# Patient Record
Sex: FEMALE | Race: WHITE | NOT HISPANIC OR LATINO | Employment: OTHER | URBAN - METROPOLITAN AREA
[De-identification: names, ages, dates, MRNs, and addresses within clinical notes are randomized per-mention and may not be internally consistent; named-entity substitution may affect disease eponyms.]

---

## 2017-02-22 ENCOUNTER — OFFICE VISIT (OUTPATIENT)
Dept: FAMILY MEDICINE | Facility: CLINIC | Age: 32
End: 2017-02-22
Payer: MEDICARE

## 2017-02-22 VITALS
TEMPERATURE: 100 F | BODY MASS INDEX: 39.62 KG/M2 | HEART RATE: 130 BPM | WEIGHT: 246.5 LBS | OXYGEN SATURATION: 97 % | SYSTOLIC BLOOD PRESSURE: 144 MMHG | HEIGHT: 66 IN | DIASTOLIC BLOOD PRESSURE: 84 MMHG | RESPIRATION RATE: 18 BRPM

## 2017-02-22 DIAGNOSIS — L03.114 CELLULITIS OF LEFT UPPER EXTREMITY: Primary | ICD-10-CM

## 2017-02-22 PROCEDURE — 99999 PR PBB SHADOW E&M-EST. PATIENT-LVL III: CPT | Mod: PBBFAC,,, | Performed by: INTERNAL MEDICINE

## 2017-02-22 PROCEDURE — 99214 OFFICE O/P EST MOD 30 MIN: CPT | Mod: S$GLB,,, | Performed by: INTERNAL MEDICINE

## 2017-02-22 RX ORDER — BETAMETHASONE VALERATE 0.1 %
LOTION (ML) TOPICAL
COMMUNITY
Start: 2017-01-24 | End: 2018-10-18

## 2017-02-22 RX ORDER — LINACLOTIDE 290 UG/1
CAPSULE, GELATIN COATED ORAL
COMMUNITY
Start: 2017-02-14 | End: 2018-10-18

## 2017-02-22 RX ORDER — MOMETASONE FUROATE 1 MG/G
CREAM TOPICAL
COMMUNITY
Start: 2017-02-10 | End: 2018-10-18

## 2017-02-22 RX ORDER — SULFAMETHOXAZOLE AND TRIMETHOPRIM 800; 160 MG/1; MG/1
1 TABLET ORAL 2 TIMES DAILY
Qty: 14 TABLET | Refills: 0 | Status: SHIPPED | OUTPATIENT
Start: 2017-02-22 | End: 2017-03-01

## 2017-02-22 NOTE — PROGRESS NOTES
"Subjective:       Patient ID: Shadia Johnson is a 31 y.o. female.    Chief Complaint: Cyst (left arm x's 2 days )    HPI Comments: Lump on left arm x one day    HPI: 32 y/o presents for pain red lump on arm x one day. Had fever to 103 yesterday. Last night attempted to "pop" area with needle with return of blood no further discharge. Distant history of previous abscesses in past    Review of Systems   Constitutional: Negative for activity change, appetite change, fatigue, fever and unexpected weight change.   HENT: Negative for ear pain, rhinorrhea and sore throat.    Eyes: Negative for discharge and visual disturbance.   Respiratory: Negative for chest tightness, shortness of breath and wheezing.    Cardiovascular: Negative for chest pain, palpitations and leg swelling.   Gastrointestinal: Negative for abdominal pain, constipation and diarrhea.   Endocrine: Negative for cold intolerance and heat intolerance.   Genitourinary: Negative for dysuria and hematuria.   Musculoskeletal: Negative for joint swelling and neck stiffness.   Skin: Negative for rash.   Neurological: Negative for dizziness, syncope, weakness and headaches.   Psychiatric/Behavioral: Negative for suicidal ideas.       Objective:     Vitals:    02/22/17 1017   BP: (!) 144/84   BP Location: Right arm   Patient Position: Sitting   BP Method: Manual   Pulse: (!) 130   Resp: 18   Temp: 99.8 °F (37.7 °C)   TempSrc: Oral   SpO2: 97%   Weight: 111.8 kg (246 lb 7.6 oz)   Height: 5' 6" (1.676 m)          Physical Exam   Constitutional: She is oriented to person, place, and time. She appears well-developed and well-nourished.   HENT:   Head: Normocephalic and atraumatic.   Eyes: Conjunctivae are normal. Pupils are equal, round, and reactive to light.   Neck: Normal range of motion.   Cardiovascular: Normal rate and regular rhythm.  Exam reveals no gallop and no friction rub.    No murmur heard.  Pulmonary/Chest: Effort normal and breath sounds normal. She " has no wheezes. She has no rales.   Abdominal: Soft. Bowel sounds are normal. There is no tenderness. There is no rebound and no guarding.   Musculoskeletal: Normal range of motion. She exhibits no edema or tenderness.   Neurological: She is alert and oriented to person, place, and time. No cranial nerve deficit.   Skin: Skin is warm and dry. There is erythema.   Left anterior acubital fossa shows 3cm area of non streaking erythema withotu fluctuance, small central area of induration (no expressible discharge) three puncture sites withotu bleeding. Distal pulses intact with good motor function   Psychiatric: She has a normal mood and affect.       Assessment:       1. Cellulitis of left upper extremity        Plan:    no evidence of abscess cover for MRSA with bactrim ds bid x seven days if erythema extends to distal forearm or fever persists recommend ED evaluation

## 2017-02-27 RX ORDER — ALBUTEROL SULFATE 90 UG/1
AEROSOL, METERED RESPIRATORY (INHALATION)
Qty: 36 G | Refills: 5 | Status: SHIPPED | OUTPATIENT
Start: 2017-02-27 | End: 2017-12-21 | Stop reason: SDUPTHER

## 2017-04-27 ENCOUNTER — OFFICE VISIT (OUTPATIENT)
Dept: FAMILY MEDICINE | Facility: CLINIC | Age: 32
End: 2017-04-27
Payer: MEDICARE

## 2017-04-27 VITALS
HEART RATE: 119 BPM | TEMPERATURE: 99 F | OXYGEN SATURATION: 95 % | HEIGHT: 67 IN | BODY MASS INDEX: 39.35 KG/M2 | WEIGHT: 250.69 LBS | DIASTOLIC BLOOD PRESSURE: 80 MMHG | SYSTOLIC BLOOD PRESSURE: 122 MMHG

## 2017-04-27 DIAGNOSIS — B37.2 CUTANEOUS CANDIDIASIS: Primary | ICD-10-CM

## 2017-04-27 PROCEDURE — 1160F RVW MEDS BY RX/DR IN RCRD: CPT | Mod: S$GLB,,, | Performed by: FAMILY MEDICINE

## 2017-04-27 PROCEDURE — 99214 OFFICE O/P EST MOD 30 MIN: CPT | Mod: S$GLB,,, | Performed by: FAMILY MEDICINE

## 2017-04-27 PROCEDURE — 99999 PR PBB SHADOW E&M-EST. PATIENT-LVL III: CPT | Mod: PBBFAC,,, | Performed by: FAMILY MEDICINE

## 2017-04-27 RX ORDER — FLUCONAZOLE 150 MG/1
150 TABLET ORAL ONCE
Qty: 1 TABLET | Refills: 0 | Status: SHIPPED | OUTPATIENT
Start: 2017-04-27 | End: 2017-04-27

## 2017-04-27 RX ORDER — NYSTATIN 100000 [USP'U]/G
POWDER TOPICAL 2 TIMES DAILY
Qty: 30 G | Refills: 0 | Status: SHIPPED | OUTPATIENT
Start: 2017-04-27 | End: 2018-06-04 | Stop reason: SDUPTHER

## 2017-04-27 NOTE — PROGRESS NOTES
Routine Office Visit    Patient Name: Shadia Johnson    : 1985  MRN: [id number]    Subjective:  Shadia is a 32 y.o. female who presents today for:    1. Rash  Patient presenting today with a rash that is located underneath both breasts and in bilateral inguinal areas.  She states that they are red and itchy.  They started after she had a cystoscope earlier this month.  There has been no drainage.  She states that they are uncomfortable due to the itching.  She denies having a rash like this before.  She also complains of an irritated area on her scalp.  She states that it appeared recently too.  Occasionally it does itch, but is more irritated.  There has been no drainage from it either.  She has not been bitten by anything that she knows of.    Past Medical History  Past Medical History:   Diagnosis Date    Anemia     Anxiety     Asthma     Childhood    Depression     Endometriosis of pelvis     Endometriosis, severe     Stage 4    History of endometriosis     Interstitial cystitis     Ulcer     hunners       Past Surgical History  Past Surgical History:   Procedure Laterality Date    COLONOSCOPY      HYSTERECTOMY      endometriosis    interSTIM IMPLANT      LAPOROSCOPY      left salpingectomy      MAMMOGRAM  2010    TRACHELECTOMY  12       Family History  Family History   Problem Relation Age of Onset    Alcohol abuse Mother     Cancer Maternal Grandmother      skin    Depression Maternal Grandmother     Heart disease Paternal Uncle     Hyperlipidemia Paternal Uncle        Social History  Social History     Social History    Marital status:      Spouse name: N/A    Number of children: N/A    Years of education: N/A     Occupational History    Not on file.     Social History Main Topics    Smoking status: Never Smoker    Smokeless tobacco: Never Used      Comment: , .  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  Disabled, SSDI.  Kids: 9 & 2.    Alcohol use No     Drug use: No    Sexual activity: Yes     Partners: Male     Birth control/ protection: Surgical      Comment: : Todd  ( at [facility]).      Other Topics Concern    Not on file     Social History Narrative    .  Occup:  Disabled.         Current Medications  Current Outpatient Prescriptions on File Prior to Visit   Medication Sig Dispense Refill    betamethasone valerate 0.1% (VALISONE) 0.1 % Lotn       diazepam (VALIUM) 10 MG Tab Take 10 mg by mouth once daily.        duloxetine (CYMBALTA) 30 MG capsule Take 30 mg by mouth once daily.      ESTRACE 0.01 % (0.1 mg/gram) vaginal cream APPLY 2 grams VAGINALLY DAILY 42.5 g 4    estradiol 0.5 mg (0.1 %) GlPk Place 0.5 MBq/day onto the skin once daily. 30 packet 11    lidocaine HCL 2% (XYLOCAINE) 2 % jelly       LINZESS 290 mcg Cap       LYRICA 100 mg capsule       mometasone 0.1% (ELOCON) 0.1 % cream       oxycodone (ROXICODONE) 15 MG Tab Take 15 mg by mouth every 4 (four) hours as needed.       OXYCONTIN 30 mg TR12 12 hr tablet       trazodone (DESYREL) 100 MG tablet nightly as needed.       VENTOLIN HFA 90 mcg/actuation inhaler INHALE 2 puffs into the lungs EVERY 6 HOURS AS NEEDED 36 g 5    zolpidem (AMBIEN) 5 MG Tab 10 mg.       calcium citrate-vitamin D3 315-200 mg (CITRACAL+D) 315-200 mg-unit per tablet Take 1 tablet by mouth 2 (two) times daily. 60 tablet 11    [DISCONTINUED] testosterone cypionate (DEPOTESTOTERONE CYPIONATE) 200 mg/mL injection Inject 0.25 mLs (50 mg total) into the muscle every 28 days. 5 mL 4     No current facility-administered medications on file prior to visit.        Allergies   Review of patient's allergies indicates:  No Known Allergies    Review of Systems (Pertinent positives)  Review of Systems   Constitutional: Negative.    HENT: Negative.    Eyes: Negative.    Respiratory: Negative.    Cardiovascular: Negative.    Gastrointestinal: Negative.    Genitourinary: Negative.    Musculoskeletal:  "Negative.    Skin: Positive for itching and rash.         /80 (BP Location: Right arm, Patient Position: Sitting, BP Method: Manual)  Pulse (!) 119  Temp 99.4 °F (37.4 °C) (Oral)   Ht 5' 6.5" (1.689 m)  Wt 113.7 kg (250 lb 10.6 oz)  SpO2 95%  BMI 39.85 kg/m2    GENERAL APPEARANCE: in no apparent distress and well developed and well nourished  HEENT: PERRL, EOMI, Sclera clear, anicteric, Oropharynx clear, no lesions, Neck supple with midline trachea  NECK: normal, supple, no adenopathy, thyroid normal in size  RESPIRATORY: appears well, vitals normal, no respiratory distress, acyanotic, normal RR, chest clear, no wheezing, crepitations, rhonchi, normal symmetric air entry  HEART: regular rate and rhythm, S1, S2 normal, no murmur, click, rub or gallop.    ABDOMEN: abdomen is soft without tenderness, no masses, no hernias, no organomegaly, no rebound, no guarding. Suprapubic tenderness absent. No CVA tenderness.  SKIN: abnormal skin lesion on scalp without drainage; brown in color and no surrounding erythema.  Rash under breasts and in inguinal areas are red patches that resemble fungal infection  PSYCH: Alert, oriented x 3, thought content appropriate, speech normal, pleasant and cooperative, good eye contact, well groomed    Assessment/Plan:  Shadia Johnson is a 32 y.o. female who presents today for :    Shadia was seen today for rash.    Diagnoses and all orders for this visit:    Cutaneous candidiasis  -     nystatin (MYCOSTATIN) powder; Apply topically 2 (two) times daily.  -     fluconazole (DIFLUCAN) 150 MG Tab; Take 1 tablet (150 mg total) by mouth once.      1.  Patient to use Selsum blue for scalp  2.  Diflucan and nystatin powder to rash on torso and inguinal area  3.  Follow up as needed  4  Patient concerned she had a fever today due to having elevated temp after drinking a cold drink this morning.  Physical exam is normal    aVldo Newberry MD    "

## 2017-04-27 NOTE — MR AVS SNAPSHOT
[facility]  [address] [place] [address]  Phone: [phone]                  Shadia Johnson   2017 8:00 AM   Office Visit    Description:  Female : 1985   Provider:  Valdo Newberry MD   Department:  [facility]           Reason for Visit     Rash           Diagnoses this Visit        Comments    Cutaneous candidiasis    -  Primary            To Do List           Goals (5 Years of Data)     None      Follow-Up and Disposition     Return if symptoms worsen or fail to improve.       These Medications        Disp Refills Start End    nystatin (MYCOSTATIN) powder 30 g 0 2017     Apply topically 2 (two) times daily. - Topical (Top)    Pharmacy: [address] Ph #: [phone]       fluconazole (DIFLUCAN) 150 MG Tab 1 tablet 0 2017    Take 1 tablet (150 mg total) by mouth once. - Oral    Pharmacy: [address] Ph #: [phone]         [facility]     [facility] Care Line -  Assistance  Unless otherwise directed by your provider, please contact [facility], our nurse care line that is available for  assistance.     Registered nurses in the [facility] provide: appointment scheduling, clinical advisement, health education, and other advisory services.  Call: [phone] (toll free)               Medications           Message regarding Medications     Verify the changes and/or additions to your medication regime listed below are the same as discussed with your clinician today.  If any of these changes or additions are incorrect, please notify your healthcare provider.        START taking these NEW medications        Refills    nystatin (MYCOSTATIN) powder 0    Sig: Apply topically 2 (two) times daily.    Class: Normal    Route: Topical (Top)    fluconazole (DIFLUCAN) 150 MG Tab 0    Sig:  "Take 1 tablet (150 mg total) by mouth once.    Class: Normal    Route: Oral      STOP taking these medications     testosterone cypionate (DEPOTESTOTERONE CYPIONATE) 200 mg/mL injection Inject 0.25 mLs (50 mg total) into the muscle every 28 days.           Verify that the below list of medications is an accurate representation of the medications you are currently taking.  If none reported, the list may be blank. If incorrect, please contact your healthcare provider. Carry this list with you in case of emergency.           Current Medications     betamethasone valerate 0.1% (VALISONE) 0.1 % Lotn     diazepam (VALIUM) 10 MG Tab Take 10 mg by mouth once daily.      duloxetine (CYMBALTA) 30 MG capsule Take 30 mg by mouth once daily.    ESTRACE 0.01 % (0.1 mg/gram) vaginal cream APPLY 2 grams VAGINALLY DAILY    estradiol 0.5 mg (0.1 %) GlPk Place 0.5 MBq/day onto the skin once daily.    lidocaine HCL 2% (XYLOCAINE) 2 % jelly     LINZESS 290 mcg Cap     LYRICA 100 mg capsule     mometasone 0.1% (ELOCON) 0.1 % cream     oxycodone (ROXICODONE) 15 MG Tab Take 15 mg by mouth every 4 (four) hours as needed.     OXYCONTIN 30 mg TR12 12 hr tablet     trazodone (DESYREL) 100 MG tablet nightly as needed.     VENTOLIN HFA 90 mcg/actuation inhaler INHALE 2 puffs into the lungs EVERY 6 HOURS AS NEEDED    zolpidem (AMBIEN) 5 MG Tab 10 mg.     calcium citrate-vitamin D3 315-200 mg (CITRACAL+D) 315-200 mg-unit per tablet Take 1 tablet by mouth 2 (two) times daily.    fluconazole (DIFLUCAN) 150 MG Tab Take 1 tablet (150 mg total) by mouth once.    nystatin (MYCOSTATIN) powder Apply topically 2 (two) times daily.           Clinical Reference Information           Your Vitals Were     BP Pulse Temp Height    122/80 (BP Location: Right arm, Patient Position: Sitting, BP Method: Manual) 119 99.4 °F (37.4 °C) (Oral) 5' 6.5" (1.689 m)    Weight SpO2 BMI    113.7 kg (250 lb 10.6 oz) 95% 39.85 kg/m2      Blood Pressure          Most Recent Value "    BP  122/80      Allergies as of 4/27/2017     No Known Allergies      Immunizations Administered on Date of Encounter - 4/27/2017     None      Language Assistance Services     ATTENTION: Language assistance services are available, free of charge. Please call [phone].      ATENCIÓN: Si habla jv, tiene a manzano disposición servicios gratuitos de asistencia lingüística. Llame al [phone].     CHÚ Ý: N?u b?n nói Ti?ng Vi?t, có các d?ch v? h? tr? ngôn ng? mi?n phí dành cho b?n. G?i s? [phone].         [facility] complies with applicable Federal civil rights laws and does not discriminate on the basis of race, color, national origin, age, disability, or sex.

## 2017-10-11 ENCOUNTER — APPOINTMENT (OUTPATIENT)
Dept: RADIOLOGY | Facility: HOSPITAL | Age: 32
End: 2017-10-11
Attending: INTERNAL MEDICINE
Payer: MEDICARE

## 2017-10-11 ENCOUNTER — OFFICE VISIT (OUTPATIENT)
Dept: FAMILY MEDICINE | Facility: CLINIC | Age: 32
End: 2017-10-11
Payer: MEDICARE

## 2017-10-11 VITALS
SYSTOLIC BLOOD PRESSURE: 136 MMHG | BODY MASS INDEX: 40.07 KG/M2 | WEIGHT: 255.31 LBS | RESPIRATION RATE: 17 BRPM | TEMPERATURE: 100 F | HEIGHT: 67 IN | OXYGEN SATURATION: 95 % | HEART RATE: 120 BPM | DIASTOLIC BLOOD PRESSURE: 88 MMHG

## 2017-10-11 DIAGNOSIS — J18.9 PNEUMONIA DUE TO INFECTIOUS ORGANISM, UNSPECIFIED LATERALITY, UNSPECIFIED PART OF LUNG: ICD-10-CM

## 2017-10-11 DIAGNOSIS — J18.9 PNEUMONIA DUE TO INFECTIOUS ORGANISM, UNSPECIFIED LATERALITY, UNSPECIFIED PART OF LUNG: Primary | ICD-10-CM

## 2017-10-11 PROCEDURE — 71020 XR CHEST PA AND LATERAL: CPT | Mod: TC,PN

## 2017-10-11 PROCEDURE — 71020 XR CHEST PA AND LATERAL: CPT | Mod: 26,,, | Performed by: RADIOLOGY

## 2017-10-11 PROCEDURE — 99499 UNLISTED E&M SERVICE: CPT | Mod: S$GLB,,, | Performed by: INTERNAL MEDICINE

## 2017-10-11 PROCEDURE — 99999 PR PBB SHADOW E&M-EST. PATIENT-LVL III: CPT | Mod: PBBFAC,,, | Performed by: INTERNAL MEDICINE

## 2017-10-11 PROCEDURE — 99214 OFFICE O/P EST MOD 30 MIN: CPT | Mod: S$GLB,,, | Performed by: INTERNAL MEDICINE

## 2017-10-11 RX ORDER — PREGABALIN 225 MG/1
CAPSULE ORAL
COMMUNITY
Start: 2017-09-28 | End: 2018-10-18

## 2017-10-11 RX ORDER — BENZONATATE 200 MG/1
200 CAPSULE ORAL 3 TIMES DAILY PRN
Qty: 30 CAPSULE | Refills: 0 | Status: SHIPPED | OUTPATIENT
Start: 2017-10-11 | End: 2017-10-21

## 2017-10-11 RX ORDER — ZOLPIDEM TARTRATE 10 MG/1
TABLET ORAL
COMMUNITY
Start: 2017-10-07 | End: 2018-10-18

## 2017-10-11 RX ORDER — LEVOFLOXACIN 500 MG/1
500 TABLET, FILM COATED ORAL DAILY
Qty: 10 TABLET | Refills: 0 | Status: SHIPPED | OUTPATIENT
Start: 2017-10-11 | End: 2018-01-23

## 2017-10-11 RX ORDER — ESTRADIOL VALERATE 20 MG/ML
INJECTION INTRAMUSCULAR
COMMUNITY
Start: 2017-09-08 | End: 2018-02-08 | Stop reason: SDUPTHER

## 2017-10-11 NOTE — PROGRESS NOTES
Subjective:       Patient ID: Shadia Johnson is a 32 y.o. female.    Chief Complaint: Cough; Nasal Congestion; and Sore Throat    Cough   This is a new problem. The current episode started 1 to 4 weeks ago (x2 weeks). The problem has been waxing and waning (worsening over last week). The problem occurs constantly. The cough is productive of purulent sputum. Associated symptoms include chest pain, a fever, nasal congestion, rhinorrhea and wheezing. Pertinent negatives include no chills, ear congestion, ear pain, headaches, heartburn, hemoptysis, myalgias, postnasal drip, rash, sore throat, shortness of breath, sweats or weight loss. Associated symptoms comments: Max temp 103 two days ago taking APAP and NSAID daily  . Nothing aggravates the symptoms. Risk factors for lung disease include animal exposure. She has tried OTC cough suppressant, a beta-agonist inhaler, body position changes, cool air and prescription cough suppressant for the symptoms. The treatment provided no relief. Her past medical history is significant for asthma. There is no history of bronchiectasis, bronchitis, COPD, emphysema, environmental allergies or pneumonia.     Review of Systems   Constitutional: Positive for fever. Negative for activity change, appetite change, chills, fatigue, unexpected weight change and weight loss.   HENT: Positive for rhinorrhea. Negative for ear pain, postnasal drip and sore throat.    Eyes: Negative for discharge and visual disturbance.   Respiratory: Positive for wheezing. Negative for hemoptysis, chest tightness and shortness of breath.    Cardiovascular: Positive for chest pain. Negative for palpitations and leg swelling.   Gastrointestinal: Negative for abdominal pain, constipation, diarrhea and heartburn.   Endocrine: Negative for cold intolerance and heat intolerance.   Genitourinary: Negative for dysuria and hematuria.   Musculoskeletal: Negative for joint swelling, myalgias and neck stiffness.   Skin:  "Negative for rash.   Allergic/Immunologic: Negative for environmental allergies.   Neurological: Negative for dizziness, syncope, weakness and headaches.   Psychiatric/Behavioral: Negative for suicidal ideas.       Objective:     Vitals:    10/11/17 1116   BP: 136/88   BP Location: Left arm   Patient Position: Sitting   BP Method: Large (Manual)   Pulse: (!) 120   Resp: 17   Temp: 99.6 °F (37.6 °C)   TempSrc: Oral   SpO2: 95%   Weight: 115.8 kg (255 lb 4.7 oz)   Height: 5' 6.5" (1.689 m)          Physical Exam   Constitutional: She is oriented to person, place, and time. She appears well-developed and well-nourished.   HENT:   Head: Normocephalic and atraumatic.   Eyes: Conjunctivae are normal. No scleral icterus.   Neck: Normal range of motion.   Cardiovascular: Regular rhythm.  Exam reveals no gallop and no friction rub.    No murmur heard.  No LE edema, tachy rate 100's and regular   Pulmonary/Chest: Effort normal. She has wheezes. She has no rales.   Right upper lobe +crackle no wheeze no dullness to percussion   Abdominal: Soft. Bowel sounds are normal. There is no tenderness. There is no rebound and no guarding.   Musculoskeletal: Normal range of motion. She exhibits no edema or tenderness.   Neurological: She is alert and oriented to person, place, and time. No cranial nerve deficit.   Skin: Skin is warm and dry.   Psychiatric: She has a normal mood and affect.       Assessment:       1. Pneumonia due to infectious organism, unspecified laterality, unspecified part of lung        Plan:    1. Febrile with risk factors for complication including chronic opioid use morbid obesity. With fever and tachycardia (although has been tachy in past when no acutely ill) cover for CAP with fluroquinolone tessalon for symptomatic relief of cough       "

## 2017-10-11 NOTE — PROGRESS NOTES
Patient, Shadia Johnson (MRN #[id number]), presented with a recorded BMI of 40.59 kg/m^2 consistent with the definition of morbid obesity (ICD-10 E66.01). The patient's morbid obesity was monitored, evaluated, addressed and/or treated. This addendum to the medical record is made on 10/11/2017.

## 2017-11-28 DIAGNOSIS — N95.9 MENOPAUSAL AND PERIMENOPAUSAL DISORDER: ICD-10-CM

## 2017-11-28 DIAGNOSIS — N95.1 SYMPTOMATIC MENOPAUSAL OR FEMALE CLIMACTERIC STATES: ICD-10-CM

## 2017-11-30 RX ORDER — ESTRADIOL 0.1 MG/G
CREAM VAGINAL
Qty: 42.5 G | Refills: 6 | OUTPATIENT
Start: 2017-11-30

## 2017-12-21 RX ORDER — ALBUTEROL SULFATE 90 UG/1
AEROSOL, METERED RESPIRATORY (INHALATION)
Qty: 36 G | Refills: 5 | Status: SHIPPED | OUTPATIENT
Start: 2017-12-21 | End: 2018-09-10 | Stop reason: SDUPTHER

## 2018-01-23 ENCOUNTER — OFFICE VISIT (OUTPATIENT)
Dept: FAMILY MEDICINE | Facility: CLINIC | Age: 33
End: 2018-01-23
Payer: MEDICARE

## 2018-01-23 ENCOUNTER — TELEPHONE (OUTPATIENT)
Dept: FAMILY MEDICINE | Facility: CLINIC | Age: 33
End: 2018-01-23

## 2018-01-23 VITALS
HEIGHT: 67 IN | OXYGEN SATURATION: 98 % | HEART RATE: 105 BPM | DIASTOLIC BLOOD PRESSURE: 86 MMHG | WEIGHT: 255.75 LBS | TEMPERATURE: 99 F | SYSTOLIC BLOOD PRESSURE: 130 MMHG | BODY MASS INDEX: 40.14 KG/M2 | RESPIRATION RATE: 17 BRPM

## 2018-01-23 DIAGNOSIS — R68.89 FLU-LIKE SYMPTOMS: Primary | ICD-10-CM

## 2018-01-23 DIAGNOSIS — J06.9 VIRAL URI WITH COUGH: ICD-10-CM

## 2018-01-23 LAB
CTP QC/QA: YES
FLUAV AG NPH QL: NEGATIVE
FLUBV AG NPH QL: NEGATIVE

## 2018-01-23 PROCEDURE — 87804 INFLUENZA ASSAY W/OPTIC: CPT | Mod: 59,QW,S$GLB, | Performed by: NURSE PRACTITIONER

## 2018-01-23 PROCEDURE — 99214 OFFICE O/P EST MOD 30 MIN: CPT | Mod: S$GLB,,, | Performed by: NURSE PRACTITIONER

## 2018-01-23 PROCEDURE — 99499 UNLISTED E&M SERVICE: CPT | Mod: S$GLB,,, | Performed by: NURSE PRACTITIONER

## 2018-01-23 PROCEDURE — 99999 PR PBB SHADOW E&M-EST. PATIENT-LVL III: CPT | Mod: PBBFAC,,, | Performed by: NURSE PRACTITIONER

## 2018-01-23 RX ORDER — CETIRIZINE HYDROCHLORIDE 10 MG/1
10 TABLET ORAL DAILY
Qty: 30 TABLET | Refills: 1 | Status: SHIPPED | OUTPATIENT
Start: 2018-01-23 | End: 2018-10-18

## 2018-01-23 RX ORDER — FLUTICASONE PROPIONATE 50 MCG
2 SPRAY, SUSPENSION (ML) NASAL DAILY
Qty: 1 BOTTLE | Refills: 1 | Status: SHIPPED | OUTPATIENT
Start: 2018-01-23 | End: 2018-02-22

## 2018-01-23 RX ORDER — BENZONATATE 200 MG/1
200 CAPSULE ORAL 3 TIMES DAILY PRN
Qty: 30 CAPSULE | Refills: 0 | Status: SHIPPED | OUTPATIENT
Start: 2018-01-23 | End: 2018-02-02

## 2018-01-23 NOTE — PROGRESS NOTES
Upper Respiratory Infection  Patient complains of a 1day history of some URI complaints. Associated symptoms include T 100.9, congestion, NP cough.  She has attempted dayquil, ibuprofen OTC.  Sick contacts include none.  She has not had a flu shot this season.    Subjective:       Patient ID: Shadia Johnson is a 32 y.o. female.      Review of Systems   Constitutional: Positive for fever.   HENT: Positive for congestion.    Respiratory: Positive for cough.        Objective:      Physical Exam   Constitutional: She is oriented to person, place, and time. She appears well-developed and well-nourished. She appears ill. No distress.   HENT:   Head: Normocephalic and atraumatic.   Right Ear: A middle ear effusion is present.   Left Ear: A middle ear effusion is present.   Nose: Mucosal edema and rhinorrhea present. Right sinus exhibits no maxillary sinus tenderness and no frontal sinus tenderness. Left sinus exhibits no maxillary sinus tenderness and no frontal sinus tenderness.   Mouth/Throat: Uvula is midline and mucous membranes are normal. Posterior oropharyngeal erythema present. No oropharyngeal exudate or posterior oropharyngeal edema.   Cardiovascular: Normal rate, regular rhythm and normal heart sounds.  Exam reveals no friction rub.    No murmur heard.  Pulmonary/Chest: Effort normal and breath sounds normal. No respiratory distress. She has no decreased breath sounds. She has no wheezes. She has no rhonchi. She has no rales.   Musculoskeletal: Normal range of motion.   Lymphadenopathy:        Head (right side): No submental, no submandibular and no tonsillar adenopathy present.        Head (left side): No submental, no submandibular and no tonsillar adenopathy present.     She has no cervical adenopathy.   Neurological: She is alert and oriented to person, place, and time.   Skin: Skin is warm and dry.   Psychiatric: She has a normal mood and affect. Her behavior is normal.   Vitals reviewed.       Assessment:       1. Flu-like symptoms    2. Viral URI with cough        Plan:       Flu-like symptoms  -     POCT Influenza A/B    Viral URI with cough  -     cetirizine (ZYRTEC) 10 MG tablet; Take 1 tablet (10 mg total) by mouth once daily.  Dispense: 30 tablet; Refill: 1  -     fluticasone (FLONASE) 50 mcg/actuation nasal spray; 2 sprays (100 mcg total) by Each Nare route once daily.  Dispense: 1 Bottle; Refill: 1  -     benzonatate (TESSALON) 200 MG capsule; Take 1 capsule (200 mg total) by mouth 3 (three) times daily as needed for Cough.  Dispense: 30 capsule; Refill: 0    Most likely viral.  Will swab for flu since it's been less than 48 hours and treat with tamiflu if positive    Follow up with primary care provider if not improved  Go to ER for new, worse or concerning symptoms  Drink plenty of fluids  Tylenol or ibuprofen as needed for fever or pain

## 2018-01-23 NOTE — PATIENT INSTRUCTIONS
"Follow up with primary care provider if not improved  Go to ER for new, worse or concerning symptoms  Drink plenty of fluids  Tylenol or ibuprofen as needed for fever or pain    Viral Syndrome (Adult)  A viral illness may cause a number of symptoms. The symptoms depend on the part of the body that the virus affects. If it settles in your nose, throat, and lungs, it may cause cough, sore throat, congestion, and sometimes headache. If it settles in your stomach and intestinal tract, it may cause vomiting and diarrhea. Sometimes it causes vague symptoms like "aching all over," feeling tired, loss of appetite, or fever.  A viral illness usually lasts 1 to 2 weeks, but sometimes it lasts longer. In some cases, a more serious infection can look like a viral syndrome in the first few days of the illness. You may need another exam and additional tests to know the difference. Watch for the warning signs listed below.  Home care  Follow these guidelines for taking care of yourself at home:  · If symptoms are severe, rest at home for the first 2 to 3 days.  · Stay away from cigarette smoke - both your smoke and the smoke from others.  · You may use over-the-counter acetaminophen or ibuprofen for fever, muscle aching, and headache, unless another medicine was prescribed for this. If you have chronic liver or kidney disease or ever had a stomach ulcer or GI bleeding, talk with your doctor before using these medicines. No one who is younger than 18 and ill with a fever should take aspirin. It may cause severe disease or death.  · Your appetite may be poor, so a light diet is fine. Avoid dehydration by drinking 8 to 12 8-ounce glasses of fluids each day. This may include water; orange juice; lemonade; apple, grape, and cranberry juice; clear fruit drinks; electrolyte replacement and sports drinks; and decaffeinated teas and coffee. If you have been diagnosed with a kidney disease, ask your doctor how much and what types of fluids " you should drink to prevent dehydration. If you have kidney disease, drinking too much fluid can cause it build up in the your body and be dangerous to your health.  · Over-the-counter remedies won't shorten the length of the illness but may be helpful for cough, sore throat; and nasal and sinus congestion. Don't use decongestants if you have high blood pressure.  Follow-up care  Follow up with your healthcare provider if you do not improve over the next week.  Call 911  Get emergency medical care if any of the following occur:  · Convulsion  · Feeling weak, dizzy, or like you are going to faint  · Chest pain, shortness of breath, wheezing, or difficulty breathing  When to seek medical advice  Call your healthcare provider right away if any of these occur:  · Cough with lots of colored sputum (mucus) or blood in your sputum  · Chest pain, shortness of breath, wheezing, or difficulty breathing  · Severe headache; face, neck, or ear pain  · Severe, constant pain in the lower right side of your belly (abdominal)  · Continued vomiting (cant keep liquids down)  · Frequent diarrhea (more than 5 times a day); blood (red or black color) or mucus in diarrhea  · Feeling weak, dizzy, or like you are going to faint  · Extreme thirst  · Fever of 100.4°F (38°C) or higher, or as directed by your healthcare provider  Date Last Reviewed: 9/25/2015  © 2704-7751 [employer]. [address] [place] [address], [place] [address]. All rights reserved. This information is not intended as a substitute for professional medical care. Always follow your healthcare professional's instructions.

## 2018-01-23 NOTE — PROGRESS NOTES
Patient, Shadia Johnson (MRN #[id number]), presented with a recorded BMI of 40.66 kg/m^2 consistent with the definition of morbid obesity (ICD-10 E66.01). The patient's morbid obesity was monitored, evaluated, addressed and/or treated. This addendum to the medical record is made on 01/23/2018.

## 2018-03-06 ENCOUNTER — OFFICE VISIT (OUTPATIENT)
Dept: FAMILY MEDICINE | Facility: CLINIC | Age: 33
End: 2018-03-06
Payer: MEDICARE

## 2018-03-06 VITALS
OXYGEN SATURATION: 97 % | HEART RATE: 119 BPM | DIASTOLIC BLOOD PRESSURE: 92 MMHG | TEMPERATURE: 98 F | WEIGHT: 263 LBS | HEIGHT: 67 IN | SYSTOLIC BLOOD PRESSURE: 138 MMHG | RESPIRATION RATE: 18 BRPM | BODY MASS INDEX: 41.28 KG/M2

## 2018-03-06 DIAGNOSIS — I47.10 SUPRAVENTRICULAR TACHYCARDIA: Primary | ICD-10-CM

## 2018-03-06 PROCEDURE — 93000 ELECTROCARDIOGRAM COMPLETE: CPT | Mod: S$GLB,,, | Performed by: INTERNAL MEDICINE

## 2018-03-06 PROCEDURE — 99499 UNLISTED E&M SERVICE: CPT | Mod: S$GLB,,, | Performed by: FAMILY MEDICINE

## 2018-03-06 PROCEDURE — 99214 OFFICE O/P EST MOD 30 MIN: CPT | Mod: S$GLB,,, | Performed by: FAMILY MEDICINE

## 2018-03-06 PROCEDURE — 99999 PR PBB SHADOW E&M-EST. PATIENT-LVL V: CPT | Mod: PBBFAC,,, | Performed by: FAMILY MEDICINE

## 2018-03-07 ENCOUNTER — OFFICE VISIT (OUTPATIENT)
Dept: CARDIOLOGY | Facility: CLINIC | Age: 33
End: 2018-03-07
Payer: MEDICARE

## 2018-03-07 ENCOUNTER — HOSPITAL ENCOUNTER (OUTPATIENT)
Dept: CARDIOLOGY | Facility: HOSPITAL | Age: 33
Discharge: HOME | End: 2018-03-07
Attending: INTERNAL MEDICINE
Payer: MEDICARE

## 2018-03-07 ENCOUNTER — PATIENT MESSAGE (OUTPATIENT)
Dept: CARDIOLOGY | Facility: CLINIC | Age: 33
End: 2018-03-07

## 2018-03-07 VITALS
HEIGHT: 67 IN | BODY MASS INDEX: 41.07 KG/M2 | OXYGEN SATURATION: 99 % | WEIGHT: 261.69 LBS | HEART RATE: 102 BPM | SYSTOLIC BLOOD PRESSURE: 174 MMHG | DIASTOLIC BLOOD PRESSURE: 106 MMHG | RESPIRATION RATE: 15 BRPM

## 2018-03-07 DIAGNOSIS — E78.1 ABNORMALLY LOW HIGH DENSITY LIPOPROTEIN (HDL) CHOLESTEROL WITH HYPERTRIGLYCERIDEMIA: ICD-10-CM

## 2018-03-07 DIAGNOSIS — R00.2 HEART PALPITATIONS: ICD-10-CM

## 2018-03-07 DIAGNOSIS — E66.01 MORBID OBESITY, UNSPECIFIED OBESITY TYPE: ICD-10-CM

## 2018-03-07 DIAGNOSIS — R07.9 CHEST PAIN, UNSPECIFIED TYPE: ICD-10-CM

## 2018-03-07 DIAGNOSIS — I10 ESSENTIAL HYPERTENSION: ICD-10-CM

## 2018-03-07 DIAGNOSIS — E78.6 ABNORMALLY LOW HIGH DENSITY LIPOPROTEIN (HDL) CHOLESTEROL WITH HYPERTRIGLYCERIDEMIA: ICD-10-CM

## 2018-03-07 DIAGNOSIS — N30.10 INTERSTITIAL CYSTITIS: ICD-10-CM

## 2018-03-07 DIAGNOSIS — I47.10 PSVT (PAROXYSMAL SUPRAVENTRICULAR TACHYCARDIA): ICD-10-CM

## 2018-03-07 DIAGNOSIS — R00.2 HEART PALPITATIONS: Primary | ICD-10-CM

## 2018-03-07 LAB
DIASTOLIC DYSFUNCTION: NO
ESTIMATED PA SYSTOLIC PRESSURE: 25.28
MITRAL VALVE REGURGITATION: NORMAL
RETIRED EF AND QEF - SEE NOTES: 55 (ref 55–65)
TRICUSPID VALVE REGURGITATION: NORMAL

## 2018-03-07 PROCEDURE — 99499 UNLISTED E&M SERVICE: CPT | Mod: S$GLB,,, | Performed by: INTERNAL MEDICINE

## 2018-03-07 PROCEDURE — 93970 EXTREMITY STUDY: CPT | Mod: 26,,, | Performed by: INTERNAL MEDICINE

## 2018-03-07 PROCEDURE — 99215 OFFICE O/P EST HI 40 MIN: CPT | Mod: S$GLB,,, | Performed by: INTERNAL MEDICINE

## 2018-03-07 PROCEDURE — 93306 TTE W/DOPPLER COMPLETE: CPT | Mod: 26,,, | Performed by: INTERNAL MEDICINE

## 2018-03-07 PROCEDURE — 93970 EXTREMITY STUDY: CPT

## 2018-03-07 PROCEDURE — 3080F DIAST BP >= 90 MM HG: CPT | Mod: S$GLB,,, | Performed by: INTERNAL MEDICINE

## 2018-03-07 PROCEDURE — 3077F SYST BP >= 140 MM HG: CPT | Mod: S$GLB,,, | Performed by: INTERNAL MEDICINE

## 2018-03-07 PROCEDURE — 93306 TTE W/DOPPLER COMPLETE: CPT

## 2018-03-07 PROCEDURE — 99999 PR PBB SHADOW E&M-EST. PATIENT-LVL III: CPT | Mod: PBBFAC,,, | Performed by: INTERNAL MEDICINE

## 2018-03-07 RX ORDER — METOPROLOL SUCCINATE 25 MG/1
25 TABLET, EXTENDED RELEASE ORAL DAILY
Qty: 90 TABLET | Refills: 3 | Status: SHIPPED | OUTPATIENT
Start: 2018-03-07 | End: 2018-06-04 | Stop reason: SDUPTHER

## 2018-03-07 NOTE — PROGRESS NOTES
"Subjective:       Patient ID: Shadia Johnson is a 32 y.o. female.    Chief Complaint: Chest Pain (onset 2 days )    HPI   32 year old female presents with complaint on chest pain for 2 days. She states it is heay pressure sensation located throughout her entire chest from left to right side and from base of neck to bottom of rib cage. Furthermore she has been having palpitations for several weeks. She also reports feeling excessively tired when exerted.   No radiation of pain to arms or neck. No diaphoresis.  She initially states that she has no history of cardiac disease. However, after I told her that I see that she was previously referred to cardiology for fast heart rate, she states that cardiologist told her she had no cardiac problems. I proceeded to review cardio note with her and showed her diagnosis of supraventricular tachycardia, and she states she was not told this. Cardiologist's previous recommendations were reviewed with her, and she claims she does not recall any recommendations.    Review of Systems   Constitutional: Negative for unexpected weight change.   HENT: Negative for ear pain and sore throat.    Eyes: Negative for visual disturbance.   Respiratory: Negative for wheezing.    Cardiovascular: Negative for leg swelling.   Gastrointestinal: Negative for blood in stool.   Endocrine: Negative for cold intolerance and heat intolerance.   Genitourinary: Negative for dysuria and frequency.   Skin: Negative for rash.   Hematological: Negative for adenopathy.   Psychiatric/Behavioral: Negative for suicidal ideas.       Objective:     BP (!) 138/92 (BP Location: Left arm, Patient Position: Sitting, BP Method: Large (Manual))   Pulse (!) 119   Temp 98.4 °F (36.9 °C) (Oral)   Resp 18   Ht 5' 6.5" (1.689 m)   Wt 119.3 kg (263 lb 0.1 oz)   SpO2 97%   BMI 41.81 kg/m²     Physical Exam   Constitutional: She is oriented to person, place, and time.   HENT:   Head: Normocephalic and atraumatic.   Right " Ear: Tympanic membrane, external ear and ear canal normal.   Left Ear: Tympanic membrane, external ear and ear canal normal.   Nose: Nose normal.   Mouth/Throat: Oropharynx is clear and moist.   Eyes: Conjunctivae and EOM are normal. Pupils are equal, round, and reactive to light.   Neck: Normal range of motion. Neck supple.   Cardiovascular: Regular rhythm, normal heart sounds and intact distal pulses.  Tachycardia present.    No murmur heard.  Pulses:       Carotid pulses are 2+ on the right side, and 2+ on the left side.       Radial pulses are 2+ on the right side, and 2+ on the left side.   Pulmonary/Chest: Effort normal and breath sounds normal. She has no wheezes. She has no rhonchi. She exhibits no tenderness.   Palpation of chest wall done with ANGELITA Kirby present in room   Abdominal: Soft. Bowel sounds are normal.   Neurological: She is alert and oriented to person, place, and time. No cranial nerve deficit.   Reflex Scores:       Patellar reflexes are 2+ on the right side and 2+ on the left side.  Psychiatric: She has a normal mood and affect.   Vitals reviewed.      Assessment:       1. Supraventricular tachycardia        Plan:     Shadia was seen today for chest pain. She is an established patient, new to me, with acute worsening of a chronic condition.    Diagnoses and all orders for this visit:    Supraventricular tachycardia  -     Ambulatory referral to Cardiology  Discussed with patient importance of cardiology follow up as she is symptomatic, and appointment was made for tomorrow.   Advised patient to discuss management options with specialist.   Advised if pain gets acutely worse or develops shortness of breath, or radiation to arms or neck, to call 911.

## 2018-03-07 NOTE — PROGRESS NOTES
CARDIOVASCULAR CONSULT    REASON FOR CONSULT:   Shadia Johnson is a 32 y.o. female who presents for f/u of palps, CP, testing.    PCP: Dorys  HISTORY OF PRESENT ILLNESS:   Last seen 9/2016    The patient comes in today for urgent evaluation of palpitations associated chest pain.  Symptoms were in constant for the past several days.  She does have some epigastric tenderness to palpation, although this is unlike the chest discomfort which she has previously been experiencing.  She otherwise has had no lightheadedness or syncope.  She denies any PND, orthopnea, or lower extremity edema.  There's been no melena, or claudicant symptoms.  She does report chronic hematuria related to interstitial cystitis.    The patient seems to think that her current symptomatology is related to stress and anxiety.  I suggested that we should exclude organic causes for her symptoms, and if nothing shows up, that anxiety can be considered as a possibility.    CARDIOVASCULAR HISTORY:   PSVT (Holter 9/2016)  Dyslipidemia (elev TG, low HDL 8/2016)    PAST MEDICAL HISTORY:     Past Medical History:   Diagnosis Date    Anemia     Anxiety     Asthma     Childhood    Depression     Endometriosis of pelvis     Endometriosis, severe     Stage 4    History of endometriosis     Interstitial cystitis     Ulcer     aiden       PAST SURGICAL HISTORY:     Past Surgical History:   Procedure Laterality Date    COLONOSCOPY  2011    HYSTERECTOMY  2010    endometriosis    interSTIM IMPLANT  2011    LAPOROSCOPY  2012    left salpingectomy      MAMMOGRAM  2010    TRACHELECTOMY  12       ALLERGIES AND MEDICATION:   Review of patient's allergies indicates:  No Known Allergies  Previous Medications    ALBUTEROL (VENTOLIN HFA) 90 MCG/ACTUATION INHALER    INHALE 2 puffs into the lungs EVERY 6 HOURS AS NEEDED    BETAMETHASONE VALERATE 0.1% (VALISONE) 0.1 % LOTN        CALCIUM CITRATE-VITAMIN D3 315-200 MG (CITRACAL+D) 315-200 MG-UNIT PER TABLET     Take 1 tablet by mouth 2 (two) times daily.    CETIRIZINE (ZYRTEC) 10 MG TABLET    Take 1 tablet (10 mg total) by mouth once daily.    DULOXETINE (CYMBALTA) 30 MG CAPSULE    Take 30 mg by mouth once daily.    ESTRACE 0.01 % (0.1 MG/GRAM) VAGINAL CREAM    apply 2 grams VAGINALLY DAILY    ESTRADIOL VALERATE (DELESTROGEN) 20 MG/ML INJECTION    INJECT 1 ml EVERY 30 days    LIDOCAINE HCL 2% (XYLOCAINE) 2 % JELLY        LINZESS 290 MCG CAP        LYRICA 225 MG CAP        MOMETASONE 0.1% (ELOCON) 0.1 % CREAM        NYSTATIN (MYCOSTATIN) POWDER    Apply topically 2 (two) times daily.    OXYCODONE (ROXICODONE) 15 MG TAB    Take 15 mg by mouth every 4 (four) hours as needed.     OXYCONTIN 30 MG TR12 12 HR TABLET        ZOLPIDEM (AMBIEN) 10 MG TAB           SOCIAL HISTORY:     Social History     Social History    Marital status:      Spouse name: N/A    Number of children: N/A    Years of education: N/A     Occupational History    Not on file.     Social History Main Topics    Smoking status: Never Smoker    Smokeless tobacco: Never Used      Comment: , .  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  Disabled, SSDI.  Kids: 9 & 2.    Alcohol use No    Drug use: No    Sexual activity: Yes     Partners: Male     Birth control/ protection: Surgical      Comment: : Todd  ( at hotel).      Other Topics Concern    Not on file     Social History Narrative    .  Occup:  Disabled.         FAMILY HISTORY:     Family History   Problem Relation Age of Onset    Alcohol abuse Mother     Cancer Maternal Grandmother      skin    Depression Maternal Grandmother     Heart disease Paternal Uncle     Hyperlipidemia Paternal Uncle        REVIEW OF SYSTEMS:   Review of Systems   Constitutional: Negative for chills, diaphoresis and fever.   HENT: Negative for nosebleeds.    Eyes: Negative for blurred vision, double vision and photophobia.   Respiratory: Negative for hemoptysis, shortness of breath and  "wheezing.    Cardiovascular: Positive for chest pain and palpitations. Negative for orthopnea, claudication, leg swelling and PND.   Gastrointestinal: Negative for abdominal pain, blood in stool, heartburn, melena, nausea and vomiting.   Genitourinary: Positive for hematuria (hx interstitial cystitis). Negative for flank pain.   Musculoskeletal: Negative for falls, myalgias and neck pain.   Skin: Negative for rash.   Neurological: Negative for dizziness, seizures, loss of consciousness, weakness and headaches.   Endo/Heme/Allergies: Negative for polydipsia. Does not bruise/bleed easily.   Psychiatric/Behavioral: Negative for depression and memory loss. The patient is not nervous/anxious.        PHYSICAL EXAM:     Vitals:    03/07/18 1135   BP: (!) 174/106   Pulse: 102   Resp: 15    Body mass index is 41.6 kg/m².  Weight: 118.7 kg (261 lb 11 oz)   Height: 5' 6.5" (168.9 cm)     Physical Exam   Constitutional: She is oriented to person, place, and time. She appears well-developed and well-nourished. She is cooperative.  Non-toxic appearance. No distress.   HENT:   Head: Normocephalic and atraumatic.   Eyes: Conjunctivae and EOM are normal. Pupils are equal, round, and reactive to light. No scleral icterus.   Neck: Trachea normal. Neck supple. Normal carotid pulses and no JVD present. Carotid bruit is not present. No neck rigidity. No edema present. No thyromegaly present.   Cardiovascular: Normal rate, regular rhythm, S1 normal and S2 normal.  PMI is not displaced.  Exam reveals no gallop and no friction rub.    No murmur heard.  Pulses:       Carotid pulses are 2+ on the right side, and 2+ on the left side.  Pulmonary/Chest: Effort normal and breath sounds normal. No respiratory distress. She has no wheezes. She has no rales. She exhibits no tenderness.   Abdominal: Soft. Bowel sounds are normal. She exhibits no distension. There is no hepatosplenomegaly. There is tenderness (epigastric).   obese   Musculoskeletal: " She exhibits no edema or tenderness.   Feet:   Right Foot:   Skin Integrity: Negative for ulcer.   Left Foot:   Skin Integrity: Negative for ulcer.   Neurological: She is alert and oriented to person, place, and time. No cranial nerve deficit.   Skin: Skin is warm and dry. No rash noted. No erythema.   Psychiatric: She has a normal mood and affect. Her speech is normal and behavior is normal.   Vitals reviewed.      DATA:   EKG: (personally reviewed tracing)  3/7/18 SR 96    Laboratory:  CBC:    Recent Labs  Lab 11/02/15  1925 12/02/15  0941 08/31/16  1243   WHITE BLOOD CELL COUNT 15.57 H 21.00 H 11.00   HEMOGLOBIN 12.3 13.0 11.5 L   HEMATOCRIT 38.4 39.8 35.7 L   PLATELETS 464 H 468 H 382 H       CHEMISTRIES:    Recent Labs  Lab 11/02/15  1925 12/02/15  0941 08/31/16  1243   GLUCOSE 103 103 93   SODIUM 139 138 138   POTASSIUM 3.9 3.8 3.9   BUN BLD 10 6 8   CREATININE 0.9 1.0 1.0   EGFR IF AFRICAN AMERICAN >60 >60 >60   EGFR IF NON- >60 >60 >60   CALCIUM 9.2 10.2 9.5       CARDIAC BIOMARKERS:        COAGS:    Recent Labs  Lab 05/13/15  1723   INR 1.0       LIPIDS/LFTS:    Recent Labs  Lab 11/02/15  1925 12/02/15  0941 08/31/16  1243   CHOLESTEROL  --   --  175   TRIGLYCERIDES  --   --  226 H   HDL  --   --  29 L   LDL CHOLESTEROL  --   --  100.8   NON-HDL CHOLESTEROL  --   --  146   AST 20 20 20   ALT 25 53 H 18     Lab Results   Component Value Date    TSH 5.553 (H) 08/31/2016     Free T4 (8/31/16) 0.82 (wnl)    Cardiovascular Testing:  Ex Stress Echo 9/2/16  The patient exercised for 6.0 minutes, corresponding to a functional capacity of 7 estimated METS, achieving a peak heart rate of 162 bpm, which is 91% of the age predicted maximum heart rate. -CP/EKG  Post Exercise Imaging:  Immediate post exercise images demonstrate left ventricular function augmenting.   No exercise induced wall motion abnormalities were identified.   CONCLUSIONS     1 - Normal left ventricular systolic function (EF 55-60%).      2 - Concentric remodeling.     3 - The estimated PA systolic pressure is 27 mmHg.     4 - Trivial mitral regurgitation.     5 - Mild tricuspid regurgitation.     6 - Trivial pulmonic regurgitation.   No evidence of stress induced myocardial ischemia.     Holter 9/2/16  PREDOMINANT RHYTHM  1. Sinus tachycardia with heart rates varying between 57 and 181 bpm with an average of 117 bpm.   VENTRICULAR ARRHYTHMIAS  1. There was a single PVC recorded.   2. There were no episodes of ventricular tachycardia.  SUPRA VENTRICULAR ARRHYTHMIAS  1. There were very rare PACs recorded totalling 1 and averaging less than 1 per hour.   2. There were 5 runs of sustained/non-sustained SVT.   SINUS NODE FUNCTION  1. There was no evidence of high grade SA slava block.   AV CONDUCTION  1. There was no evidence of high grade AV block.   DIARY  1. The diary was not returned  MISCELLANEOUS  1. There were rare hookup related artifacts. Overall, the study was of good quality.   2. This was a tape of adequate length (24 hrs).   CONCLUSIONS:  1. Predominant rhythm is NSR  2. Mulitple NCT likely c/w PSVT @   170 bpm    ASSESSMENT:   # Palps, PSVT noted on Holter 9/2016 (although the patient had no sxs on the day of her Holter).  # Atyp CP/CASANOVA, ADRIANNA 9/2016 normal  # HTN, uncontrolled  # BMI 42, up 2 units vs last OV  # abnl TFTs, seen by endocrinology  # mild anemia, following with urology (for interstitial cystitis) and GYN (for vaginal bleeding)  # dyslipidemia (elev TG, low HDL)    PLAN:   DDx of above: arrhythmia, anxiety, less likely CAD or PE  Start toprol XL 25mg qd  Check echo  Check LE venous US  Check d-dimer, CBC, TSH, CMP  Diet/exercise/weight loss, Na+ avoidance  RTC 1 week    Rene Muse MD, FACC

## 2018-05-30 ENCOUNTER — PES CALL (OUTPATIENT)
Dept: ADMINISTRATIVE | Facility: CLINIC | Age: 33
End: 2018-05-30

## 2018-06-04 ENCOUNTER — PATIENT MESSAGE (OUTPATIENT)
Dept: CARDIOLOGY | Facility: CLINIC | Age: 33
End: 2018-06-04

## 2018-06-04 ENCOUNTER — TELEPHONE (OUTPATIENT)
Dept: OBSTETRICS AND GYNECOLOGY | Facility: CLINIC | Age: 33
End: 2018-06-04

## 2018-06-04 DIAGNOSIS — B37.2 CUTANEOUS CANDIDIASIS: ICD-10-CM

## 2018-06-04 RX ORDER — METOPROLOL SUCCINATE 25 MG/1
25 TABLET, EXTENDED RELEASE ORAL DAILY
Qty: 90 TABLET | Refills: 0 | Status: SHIPPED | OUTPATIENT
Start: 2018-06-04 | End: 2018-09-10 | Stop reason: SDUPTHER

## 2018-06-04 RX ORDER — ESTRADIOL VALERATE 20 MG/ML
INJECTION INTRAMUSCULAR
Qty: 5 ML | Refills: 0 | OUTPATIENT
Start: 2018-06-04

## 2018-06-04 RX ORDER — NYSTATIN 100000 [USP'U]/G
POWDER TOPICAL 2 TIMES DAILY
Qty: 30 G | Refills: 0 | Status: SHIPPED | OUTPATIENT
Start: 2018-06-04 | End: 2018-10-18

## 2018-06-04 NOTE — TELEPHONE ENCOUNTER
Returned patient's call concerning wanting to be seen today because she has a lump. Pt already has an appt to see Dr. Plata on 6/6/18. Unable to reach pt. Left message for pt to call back to office. CW

## 2018-06-04 NOTE — TELEPHONE ENCOUNTER
----- Message from Alyse Keys sent at 6/4/2018  8:10 AM CDT -----  Contact: Self   Pt feels she needs to be seen today, she says she has a lump that needs to be checked out. Please call at [phone]

## 2018-06-06 ENCOUNTER — OFFICE VISIT (OUTPATIENT)
Dept: OBSTETRICS AND GYNECOLOGY | Facility: CLINIC | Age: 33
End: 2018-06-06
Payer: MEDICARE

## 2018-06-06 VITALS
SYSTOLIC BLOOD PRESSURE: 140 MMHG | WEIGHT: 274.56 LBS | HEIGHT: 67 IN | BODY MASS INDEX: 43.09 KG/M2 | DIASTOLIC BLOOD PRESSURE: 83 MMHG

## 2018-06-06 DIAGNOSIS — Z00.00 ANNUAL PHYSICAL EXAM: Primary | ICD-10-CM

## 2018-06-06 DIAGNOSIS — N80.9 ENDOMETRIOSIS: ICD-10-CM

## 2018-06-06 DIAGNOSIS — Z90.710 STATUS POST HYSTERECTOMY: ICD-10-CM

## 2018-06-06 DIAGNOSIS — N95.1 MENOPAUSAL STATE: ICD-10-CM

## 2018-06-06 DIAGNOSIS — Z79.890 HORMONE REPLACEMENT THERAPY (HRT): ICD-10-CM

## 2018-06-06 PROCEDURE — 3077F SYST BP >= 140 MM HG: CPT | Mod: CPTII,S$GLB,, | Performed by: OBSTETRICS & GYNECOLOGY

## 2018-06-06 PROCEDURE — 99999 PR PBB SHADOW E&M-EST. PATIENT-LVL III: CPT | Mod: PBBFAC,,, | Performed by: OBSTETRICS & GYNECOLOGY

## 2018-06-06 PROCEDURE — 3079F DIAST BP 80-89 MM HG: CPT | Mod: CPTII,S$GLB,, | Performed by: OBSTETRICS & GYNECOLOGY

## 2018-06-06 PROCEDURE — 99395 PREV VISIT EST AGE 18-39: CPT | Mod: S$GLB,,, | Performed by: OBSTETRICS & GYNECOLOGY

## 2018-06-06 PROCEDURE — 3008F BODY MASS INDEX DOCD: CPT | Mod: CPTII,S$GLB,, | Performed by: OBSTETRICS & GYNECOLOGY

## 2018-06-06 NOTE — PATIENT INSTRUCTIONS

## 2018-06-06 NOTE — PROGRESS NOTES
Subjective:      Chief Complaint:  VAG   LUMP    Menstrual History:    OB History      Para Term  AB Living    2 2       2    SAB TAB Ectopic Multiple Live Births                       Menarche age: 12     No LMP recorded. Patient has had a hysterectomy.            Objective:        History of Present Illness AND  Examination detailed DICTATE:         HISTORY OF PRESENT ILLNESS:  The patient is 33 years of age,  2, para 2,   here for her annual yearly exam.  Pap smear in 2014 negative.  The patient's   past medical history, asthma, endometriosis, PSVT, IC and depression.  Surgical   problem, hysterectomy, BSO, multiple abdominal surgeries, laparoscopy,   colonoscopy, trachelectomy.  The patient is complaining of pelvic pain, same   location as previously.  Previous ultrasound and CA-125 was negative.  No   indication of elevation or possible recurrence of endometriosis.  She described   the pain as worse with movement, sort of a pulling sensation.  The patient is   taking estrogen vaginal cream though wishes to change to something because it is   too messy.  The patient also was complaining of some vaginal lump.    REVIEW OF SYSTEMS:  HEAD, EAR, EYES, NOSE, AND THROAT:  Negative.  CARDIORESPIRATORY:  PSVT.  GASTROINTESTINAL:  Negative.  GENITOURINARY:  See present illness.    PHYSICAL EXAMINATION:  GENERAL:  Well-developed, well-nourished, alert, oriented female, not in acute   distress.  VITAL SIGNS:  Blood pressure 130\86    WT   255  HEAD:  Normocephalic.  EYES:  Reactive.  NECK:  Supple.  Thyroid is not palpable, no nodes.  CHEST:  Clear.  HEART:  Regular sinus rhythm, no murmur.  BREASTS:  No lumps, mass, discharge, skin changes, retraction, nipple changes.    Axilla negative.  ABDOMEN:  Upper abdomen normal.  Lower abdomen normal.  No guarding, rebound or   tenderness.  PELVIC:  External normal.  Vulva normal.  Bartholin, urethral and Santo glands   are negative.  Vagina is clear.  Has  prominent urethra, nontender, no pain.    Good estrogenization.  Cervix, uterus, adnexa are absent.  Good apical support.    No pain elicited on exam.  RECTAL:  Negative.  MUSCULOSKELETAL:  Normal range of motion.  SKIN:  Clear.  Blood vessels palpable and equal.    IMPRESSION:  Surgical menopause.  Pelvic pain.  We will change the patient's   estrogen replacement to Estring, insert it, leave it in for three months, change   every three months.  Continue with calcium, vitamin D, multivitamin and we will   see her back within a year.      SRUTHI  dd: 06/06/2018 09:38:56 (CDT)  td: 06/07/2018 02:45:06 (CDT)  Doc ID   #[id number]  Job ID #[id number]    CC:         Assessment:      Diagnosis:ANNUAL   EXAM   GYN   EXAM    PELVIC   PAIN    MENOPAUSAL        Plan:      Return in 12  months

## 2018-07-11 DIAGNOSIS — Z79.890 PREMATURE SURGICAL MENOPAUSE ON HRT: Primary | ICD-10-CM

## 2018-07-11 DIAGNOSIS — E89.40 PREMATURE SURGICAL MENOPAUSE ON HRT: Primary | ICD-10-CM

## 2018-07-11 NOTE — TELEPHONE ENCOUNTER
----- Message from Lily Whitaker sent at 7/11/2018  1:10 PM CDT -----  Contact: Self   Refill : estradiol valerate (DELESTROGEN) 20 mg/mL injection      Pharmacy     [facility] - [place], [place] - [address]      ---------------------------------------------------------------------  7/11/18 @ 6699 ([facility])   CALL ATTEMPT TO PT UNSUCCESSFUL , MESSAGE LEFT FOR PT TO RETURN CALL TO OFFICE CONCERNING HER REFILL REQUEST FOR DELESTROGEN,  MESSAGE FORWARDED TO DR FARFAN

## 2018-07-12 RX ORDER — ESTRADIOL VALERATE 20 MG/ML
INJECTION INTRAMUSCULAR
Qty: 5 ML | Refills: 4 | Status: SHIPPED | OUTPATIENT
Start: 2018-07-12 | End: 2019-01-17 | Stop reason: SDUPTHER

## 2018-07-23 ENCOUNTER — PES CALL (OUTPATIENT)
Dept: ADMINISTRATIVE | Facility: CLINIC | Age: 33
End: 2018-07-23

## 2018-08-07 ENCOUNTER — TELEPHONE (OUTPATIENT)
Dept: FAMILY MEDICINE | Facility: CLINIC | Age: 33
End: 2018-08-07

## 2018-08-07 ENCOUNTER — TELEPHONE (OUTPATIENT)
Dept: OBSTETRICS AND GYNECOLOGY | Facility: CLINIC | Age: 33
End: 2018-08-07

## 2018-08-07 DIAGNOSIS — G89.29 OTHER CHRONIC PAIN: Primary | ICD-10-CM

## 2018-08-07 NOTE — TELEPHONE ENCOUNTER
Patient said that she received a letter in the mail stating the pain management provider that she was going to has closed his doors. Patient said that she has been taking Oxycodone 15 mg and Oxycontin 30 mg for a while now and will be needing refills. Patient is requesting a referral to a new pain management provider.

## 2018-08-07 NOTE — TELEPHONE ENCOUNTER
----- Message from Deepti Hopper sent at 8/7/2018  3:08 PM CDT -----  Contact: self  Pt asking for a call to ask a question regarding medication. Please call back at  [phone].

## 2018-08-07 NOTE — TELEPHONE ENCOUNTER
8/7/18 @ 6430 ([facility])   SPOKE WITH MS MICHELLE, INFORMED HER THAT DR FARFAN STATED SHE NEEDS TO SEE HER PCP , AND THEY CAN REFER HER TO A NEW PAIN MGT , PT STATED HER DISAPPOINTMENT

## 2018-08-07 NOTE — TELEPHONE ENCOUNTER
----- Message from Gerard Aguilera sent at 8/7/2018  1:05 PM CDT -----  Contact: Self/[phone]  Patient would like someone to contact her in regards to OXYCONTIN 30 mg TR12 12 hr tablet.    Thank you  --------------------------------------------------------------  8/7/18 @ 6175 ([facility])  SPOKE WITH MS MARTINEZ , SHE STATED HER PAIN MGT  CLOSED HIS OFFICE AND SHE IS IN NEED OF A REFILL FOR HER OXYCONTIN, SHE IS REQUESTING TO SEE IF DR FARFAN COULD POSSIBLY COVER HER FOR A MONTH SUPPLY UNTIL SHE GETS A NEW PAIN MGT  INFORMED HER THAT MORE THAN LIKE DR FARFAN WILL NOT DO THIS BUT I WILL SEND MESSAGE TO HIM

## 2018-08-17 ENCOUNTER — PES CALL (OUTPATIENT)
Dept: ADMINISTRATIVE | Facility: CLINIC | Age: 33
End: 2018-08-17

## 2018-09-10 RX ORDER — ALBUTEROL SULFATE 90 UG/1
AEROSOL, METERED RESPIRATORY (INHALATION)
Qty: 36 G | Refills: 5 | Status: SHIPPED | OUTPATIENT
Start: 2018-09-10 | End: 2019-01-17 | Stop reason: SDUPTHER

## 2018-09-11 RX ORDER — METOPROLOL SUCCINATE 25 MG/1
25 TABLET, EXTENDED RELEASE ORAL DAILY
Qty: 90 TABLET | Refills: 0 | Status: SHIPPED | OUTPATIENT
Start: 2018-09-11 | End: 2019-11-06 | Stop reason: SDUPTHER

## 2018-09-19 ENCOUNTER — PES CALL (OUTPATIENT)
Dept: ADMINISTRATIVE | Facility: CLINIC | Age: 33
End: 2018-09-19

## 2018-09-26 ENCOUNTER — PES CALL (OUTPATIENT)
Dept: ADMINISTRATIVE | Facility: CLINIC | Age: 33
End: 2018-09-26

## 2018-10-18 ENCOUNTER — OFFICE VISIT (OUTPATIENT)
Dept: FAMILY MEDICINE | Facility: CLINIC | Age: 33
End: 2018-10-18
Payer: MEDICARE

## 2018-10-18 VITALS
HEART RATE: 96 BPM | DIASTOLIC BLOOD PRESSURE: 80 MMHG | WEIGHT: 265.19 LBS | SYSTOLIC BLOOD PRESSURE: 122 MMHG | HEIGHT: 66 IN | OXYGEN SATURATION: 99 % | TEMPERATURE: 98 F | BODY MASS INDEX: 42.62 KG/M2

## 2018-10-18 DIAGNOSIS — R05.3 CHRONIC COUGH: Primary | ICD-10-CM

## 2018-10-18 DIAGNOSIS — E66.01 CLASS 3 SEVERE OBESITY DUE TO EXCESS CALORIES WITH SERIOUS COMORBIDITY AND BODY MASS INDEX (BMI) OF 40.0 TO 44.9 IN ADULT: ICD-10-CM

## 2018-10-18 PROCEDURE — 3074F SYST BP LT 130 MM HG: CPT | Mod: CPTII,,, | Performed by: FAMILY MEDICINE

## 2018-10-18 PROCEDURE — 99214 OFFICE O/P EST MOD 30 MIN: CPT | Mod: S$PBB,,, | Performed by: FAMILY MEDICINE

## 2018-10-18 PROCEDURE — 99999 PR PBB SHADOW E&M-EST. PATIENT-LVL III: CPT | Mod: PBBFAC,,, | Performed by: FAMILY MEDICINE

## 2018-10-18 PROCEDURE — 3079F DIAST BP 80-89 MM HG: CPT | Mod: CPTII,,, | Performed by: FAMILY MEDICINE

## 2018-10-18 PROCEDURE — 3008F BODY MASS INDEX DOCD: CPT | Mod: CPTII,,, | Performed by: FAMILY MEDICINE

## 2018-10-18 PROCEDURE — 99213 OFFICE O/P EST LOW 20 MIN: CPT | Mod: PBBFAC,PN | Performed by: FAMILY MEDICINE

## 2018-10-18 RX ORDER — BENZONATATE 200 MG/1
200 CAPSULE ORAL 3 TIMES DAILY PRN
Qty: 30 CAPSULE | Refills: 0 | Status: SHIPPED | OUTPATIENT
Start: 2018-10-18 | End: 2018-10-28

## 2018-10-18 RX ORDER — ESTRADIOL 0.05 MG/D
FILM, EXTENDED RELEASE TRANSDERMAL
COMMUNITY
Start: 2018-09-14 | End: 2018-10-18

## 2018-10-18 RX ORDER — OXYBUTYNIN CHLORIDE 10 MG/1
TABLET, EXTENDED RELEASE ORAL
COMMUNITY
Start: 2018-10-10 | End: 2022-03-29

## 2018-10-18 RX ORDER — OMEPRAZOLE 20 MG/1
20 CAPSULE, DELAYED RELEASE ORAL DAILY
Qty: 30 CAPSULE | Refills: 0 | Status: SHIPPED | OUTPATIENT
Start: 2018-10-18 | End: 2019-01-17 | Stop reason: SDUPTHER

## 2018-10-18 RX ORDER — LIDOCAINE 50 MG/G
OINTMENT TOPICAL
COMMUNITY
Start: 2018-09-14 | End: 2018-10-18

## 2018-10-18 RX ORDER — ESTRADIOL 2 MG/1
SYSTEM VAGINAL
COMMUNITY
Start: 2018-09-08 | End: 2019-07-25 | Stop reason: SDUPTHER

## 2018-10-19 NOTE — PROGRESS NOTES
Routine Office Visit    Patient Name: Shadia Johnson    : 1985  MRN: [id number]    Subjective:  Shadia is a 33 y.o. female who presents today for:   Chief Complaint   Patient presents with    Cough     coughing up clear discharge    Weight Loss       33-year-old female comes in with complaint of a daily cough since .  She states that on this day, she stopped taking pain medications because she no longer wants to be on them.  She claims that she stopped her narcotics cold turkey.  She states that the cough is associated with thick clear mucus, and a sore throat.  She denies allergy symptoms, denies sneezing, denies runny nose, and denies itchy eyes.  The patient does admit to some heartburn symptoms but claims that they are not often.  She states that she does not eat spicy foods, and does not drink much coffee or soda.  She complains of occasional shortness of breath but they are not related to the times that she has the cough.  She denies fevers and chills.  She does have palpitations, however this is not a new problem and she has been to Cardiology in the past.  The patient also states that she made this appointment for weight loss evaluation.  She states that even though she tries portion control in eating properly she cannot lose weight.  She has never seen a bariatric provider.  She is not interested in surgery at present.    Past Medical History  Past Medical History:   Diagnosis Date    Anemia     Anxiety     Asthma     Childhood    Depression     Endometriosis of pelvis     Endometriosis, severe     Stage 4    History of endometriosis     Interstitial cystitis     Ulcer     hunners       Past Surgical History  Past Surgical History:   Procedure Laterality Date    COLONOSCOPY  2011    EXCISION, MASS, ABDOMEN N/A 2013    [facility]    HYSTERECTOMY      endometriosis    interSTIM IMPLANT  2011    LAPOROSCOPY  2012    left  salpingectomy      LYSIS, ADHESIONS N/A 2013    [facility]    MAMMOGRAM      SALPINGO-OOPHORECTOMY, LAPAROSCOPIC Right 2013    [facility]    TRACHELECTOMY  12        Family History  Family History   Problem Relation Age of Onset    Alcohol abuse Mother     Cancer Maternal Grandmother         skin    Depression Maternal Grandmother     Heart disease Paternal Uncle     Hyperlipidemia Paternal Uncle        Social History  Social History     Socioeconomic History    Marital status:      Spouse name: Not on file    Number of children: Not on file    Years of education: Not on file    Highest education level: Not on file   Social Needs    Financial resource strain: Not on file    Food insecurity - worry: Not on file    Food insecurity - inability: Not on file    Transportation needs - medical: Not on file    Transportation needs - non-medical: Not on file   Occupational History    Not on file   Tobacco Use    Smoking status: Never Smoker    Smokeless tobacco: Never Used    Tobacco comment: , .  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  Disabled, SSDI.  Kids: 9 & 2.   Substance and Sexual Activity    Alcohol use: No    Drug use: No    Sexual activity: Yes     Partners: Male     Birth control/protection: Surgical     Comment: : Todd  ( at hotel).    Other Topics Concern    Not on file   Social History Narrative    .  Occup:  Disabled.         Current Medications  Current Outpatient Medications on File Prior to Visit   Medication Sig Dispense Refill    albuterol (VENTOLIN HFA) 90 mcg/actuation inhaler INHALE 2 puffs into the lungs EVERY 6 HOURS AS NEEDED 36 g 5    estradiol valerate (DELESTROGEN) 20 mg/mL injection INJECT 1 ml EVERY 30 days 5 mL 4    ESTRING 2 mg (7.5 mcg /24 hour) vaginal ring       metoprolol succinate (TOPROL-XL) 25 MG 24 hr tablet Take 1 tablet (25 mg total) by  mouth once daily. 90 tablet 0    oxybutynin (DITROPAN-XL) 10 MG 24 hr tablet       [DISCONTINUED] duloxetine (CYMBALTA) 30 MG capsule Take 30 mg by mouth once daily.      [DISCONTINUED] betamethasone valerate 0.1% (VALISONE) 0.1 % Lotn       [DISCONTINUED] calcium citrate-vitamin D3 315-200 mg (CITRACAL+D) 315-200 mg-unit per tablet Take 1 tablet by mouth 2 (two) times daily. 60 tablet 11    [DISCONTINUED] cetirizine (ZYRTEC) 10 MG tablet Take 1 tablet (10 mg total) by mouth once daily. 30 tablet 1    [DISCONTINUED] ESTRACE 0.01 % (0.1 mg/gram) vaginal cream apply 2 grams VAGINALLY DAILY 42.5 g 6    [DISCONTINUED] estradiol 0.05 mg/24 hr td ptsw (VIVELLE-DOT) 0.05 mg/24 hr       [DISCONTINUED] lidocaine (XYLOCAINE) 5 % Oint ointment       [DISCONTINUED] lidocaine HCL 2% (XYLOCAINE) 2 % jelly       [DISCONTINUED] LINZESS 290 mcg Cap       [DISCONTINUED] LYRICA 225 mg Cap       [DISCONTINUED] mometasone 0.1% (ELOCON) 0.1 % cream       [DISCONTINUED] nystatin (MYCOSTATIN) powder Apply topically 2 (two) times daily. 30 g 0    [DISCONTINUED] oxycodone (ROXICODONE) 15 MG Tab Take 15 mg by mouth every 4 (four) hours as needed.       [DISCONTINUED] OXYCONTIN 30 mg TR12 12 hr tablet       [DISCONTINUED] zolpidem (AMBIEN) 10 mg Tab        No current facility-administered medications on file prior to visit.        Allergies   Review of patient's allergies indicates:  No Known Allergies    Review of Systems   Constitutional: Negative for chills and fever.   HENT: Negative for congestion, rhinorrhea, sinus pressure and sneezing.    Respiratory: Positive for cough and shortness of breath. Negative for wheezing.    Cardiovascular: Positive for palpitations.   Gastrointestinal: Positive for abdominal pain. Negative for blood in stool, constipation, diarrhea, nausea and vomiting.   Genitourinary: Negative for dysuria.         /80 (BP Location: Left arm, Patient Position: Sitting, BP Method: Large (Manual))    "Pulse 96   Temp 98.4 °F (36.9 °C) (Oral)   Ht 5' 6" (1.676 m)   Wt 120.3 kg (265 lb 3.4 oz)   SpO2 99%   BMI 42.81 kg/m²     Physical Exam   Constitutional: She appears well-developed and well-nourished.   HENT:   Head: Normocephalic and atraumatic.   Right Ear: External ear normal.   Left Ear: External ear normal.   Nose: Nose normal.   Mouth/Throat: Posterior oropharyngeal erythema present. No oropharyngeal exudate.   Eyes: Conjunctivae and EOM are normal. Pupils are equal, round, and reactive to light. Right eye exhibits no discharge. Left eye exhibits no discharge.   Neck: Normal range of motion. Neck supple. No tracheal deviation present.   Cardiovascular: Normal rate, regular rhythm, normal heart sounds and intact distal pulses.   No murmur heard.  Pulmonary/Chest: Effort normal and breath sounds normal. She has no wheezes. She has no rales.   Abdominal: Soft. Bowel sounds are normal. She exhibits no mass. There is no tenderness.   Lymphadenopathy:     She has no cervical adenopathy.   Psychiatric: She has a normal mood and affect.   Vitals reviewed.      Assessment/Plan:  Shadia was seen today for cough and weight loss.    Diagnoses and all orders for this visit:    Chronic cough  -     omeprazole (PRILOSEC) 20 MG capsule; Take 1 capsule (20 mg total) by mouth once daily.  -     Ambulatory referral to ENT  -     benzonatate (TESSALON) 200 MG capsule; Take 1 capsule (200 mg total) by mouth 3 (three) times daily as needed for Cough.  Discussed with the patient that since it has been 6 weeks since she stopped her narcotic medication, and she continues to have a cough, she needs further evaluation.    She does have some GERD symptoms and signs and we discussed starting on proton pump inhibitor to which she agrees to.  Also will send to ENT for possible laryngoscopy.  Patient may also need gastroenterology referral.    Class 3 severe obesity due to excess calories with serious comorbidity and body mass index " (BMI) of 40.0 to 44.9 in adult  -     Ambulatory consult to Bariatric Medicine  Will refer patient to Bariatric Medicine for evaluation.          This office note has been dictated.  This dictation has been generated using M-Modal Fluency Direct dictation; some phonetic errors may occur.

## 2018-10-26 ENCOUNTER — OFFICE VISIT (OUTPATIENT)
Dept: OTOLARYNGOLOGY | Facility: CLINIC | Age: 33
End: 2018-10-26
Payer: MEDICARE

## 2018-10-26 VITALS
DIASTOLIC BLOOD PRESSURE: 80 MMHG | WEIGHT: 268 LBS | HEIGHT: 65 IN | SYSTOLIC BLOOD PRESSURE: 130 MMHG | BODY MASS INDEX: 44.65 KG/M2

## 2018-10-26 DIAGNOSIS — J30.1 SEASONAL ALLERGIC RHINITIS DUE TO POLLEN: ICD-10-CM

## 2018-10-26 DIAGNOSIS — B96.89 ACUTE BACTERIAL RHINOSINUSITIS: ICD-10-CM

## 2018-10-26 DIAGNOSIS — R05.9 COUGH: ICD-10-CM

## 2018-10-26 DIAGNOSIS — J01.90 ACUTE BACTERIAL RHINOSINUSITIS: ICD-10-CM

## 2018-10-26 DIAGNOSIS — R49.0 HOARSENESS: Primary | ICD-10-CM

## 2018-10-26 DIAGNOSIS — J45.909 BRONCHITIS, ALLERGIC, UNSPECIFIED ASTHMA SEVERITY, UNCOMPLICATED: ICD-10-CM

## 2018-10-26 PROCEDURE — 87070 CULTURE OTHR SPECIMN AEROBIC: CPT

## 2018-10-26 PROCEDURE — 31575 DIAGNOSTIC LARYNGOSCOPY: CPT | Mod: S$GLB,,, | Performed by: OTOLARYNGOLOGY

## 2018-10-26 PROCEDURE — 96372 THER/PROPH/DIAG INJ SC/IM: CPT | Mod: 59,S$GLB,, | Performed by: OTOLARYNGOLOGY

## 2018-10-26 PROCEDURE — 3008F BODY MASS INDEX DOCD: CPT | Mod: CPTII,S$GLB,, | Performed by: OTOLARYNGOLOGY

## 2018-10-26 PROCEDURE — 99203 OFFICE O/P NEW LOW 30 MIN: CPT | Mod: 25,S$GLB,, | Performed by: OTOLARYNGOLOGY

## 2018-10-26 PROCEDURE — 3079F DIAST BP 80-89 MM HG: CPT | Mod: CPTII,S$GLB,, | Performed by: OTOLARYNGOLOGY

## 2018-10-26 PROCEDURE — 3075F SYST BP GE 130 - 139MM HG: CPT | Mod: CPTII,S$GLB,, | Performed by: OTOLARYNGOLOGY

## 2018-10-26 RX ORDER — MONTELUKAST SODIUM 10 MG/1
10 TABLET ORAL NIGHTLY
Qty: 30 TABLET | Refills: 11 | Status: SHIPPED | OUTPATIENT
Start: 2018-10-26 | End: 2018-11-25

## 2018-10-26 RX ORDER — DOXYCYCLINE 100 MG/1
100 CAPSULE ORAL 2 TIMES DAILY
Qty: 28 CAPSULE | Refills: 0 | Status: SHIPPED | OUTPATIENT
Start: 2018-10-26 | End: 2018-11-09

## 2018-10-26 RX ORDER — BENZONATATE 100 MG/1
100 CAPSULE ORAL EVERY 6 HOURS PRN
Qty: 20 CAPSULE | Refills: 1 | Status: SHIPPED | OUTPATIENT
Start: 2018-10-26 | End: 2018-11-05

## 2018-10-26 RX ORDER — METHYLPREDNISOLONE ACETATE 80 MG/ML
60 INJECTION, SUSPENSION INTRA-ARTICULAR; INTRALESIONAL; INTRAMUSCULAR; SOFT TISSUE ONCE
Status: COMPLETED | OUTPATIENT
Start: 2018-10-26 | End: 2018-10-26

## 2018-10-26 RX ADMIN — METHYLPREDNISOLONE ACETATE 60 MG: 80 INJECTION, SUSPENSION INTRA-ARTICULAR; INTRALESIONAL; INTRAMUSCULAR; SOFT TISSUE at 03:10

## 2018-10-26 NOTE — PATIENT INSTRUCTIONS
Takes Singulair daily for allergy.  Do not take antihistamine medications.    Take the doxycycline twice a day with food for 2 weeks.  Be careful in the sun while on doxycycline.  It can make you sun sensitive.    I refilled your cough medicine.  It has no narcotic.    Use your inhaler and nasal spray as needed.    Return in 3 weeks.

## 2018-10-26 NOTE — PROGRESS NOTES
History of Present Illness:  Shadia Johnson presents to the clinic today for Cough; Sore Throat; and Other (Hoarseness)  .  She is a 33-year-old female who has had problems with hoarseness and chronic cough since 2018.  She was taking narcotic medication and stopped it and then developed a cough.  It is mostly nonproductive but sometimes she coughs until she gags.  She has nasal allergy in uses Nasonex.  She has Tessalon for cough.  She has a Ventolin inhaler.  She has a history of gastroesophageal reflux disease which is now being evaluated as a possible cause as well as her visit here today to evaluate her nose and sinuses and larynx for the etiology of her hoarseness and cough.    Past Medical History:   Diagnosis Date    Anemia     Anxiety     Asthma     Childhood    Depression     Endometriosis of pelvis     Endometriosis, severe     Stage 4    History of endometriosis     Interstitial cystitis     Ulcer     hunyolnada     Past Surgical History:   Procedure Laterality Date    COLONOSCOPY      EXCISION, MASS, ABDOMEN N/A 2013    [facility]    HYSTERECTOMY      endometriosis    interSTIM IMPLANT      LAPOROSCOPY      left salpingectomy      LYSIS, ADHESIONS N/A 2013    [facility]    MAMMOGRAM      SALPINGO-OOPHORECTOMY, LAPAROSCOPIC Right 2013    [facility]    TRACHELECTOMY  12     Family History   Problem Relation Age of Onset    Alcohol abuse Mother     Cancer Maternal Grandmother         skin    Depression Maternal Grandmother     Heart disease Paternal Uncle     Hyperlipidemia Paternal Uncle        Social History     Tobacco Use    Smoking status: Never Smoker    Smokeless tobacco: Never Used    Tobacco comment: , .  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  Disabled, SSDI.  Kids: 9 & 2.   Substance Use Topics    Alcohol use: No    Drug use: No          Review of Systems   Ears: Negative for hearing loss, ear pain and ear infections.    Nose:  Positive for nasal obstruction and postnasal drip. Negative for nasal or sinus surgery.    Mouth/Throat: Positive for hoarse voice.       Physical Exam:    Constitutional:   Vital signs are normal. She appears well-developed and well-nourished. She is active. Hoarse voice.      Head:  Normocephalic. Salivary glands normal.  Facial strength is normal.      Ears:  Hearing normal to normal and whispered voice; external ear normal without scars, lesions, or masses; ear canal, tympanic membrane, and middle ear normal..     Nose:    She has hypertrophic allergic nasal turbinates with a slight nasal septal deviation to her left causing left nasal obstruction from the septum and right nasal obstruction from more hypertrophic turbinate. There is clear postnasal drainage at present.    Mouth/Throat  Lips, teeth, and gums normal and oropharynx normal.     Neck:  Neck normal without thyromegaly masses, asymmetry, normal tracheal structure, crepitus, and tenderness, thyroid normal, trachea normal and no adenopathy.       FIBEROPTIC LARYNGOSCOPY -  After appropriate consent, her nose was sprayed with Afrin with tetracaine and an attempt was made to pass a fiberoptic laryngoscope through the left side of her nose. Her nasal septum made the some possible.  The scope was passed through the right side of her nose where she has turbinate hypertrophy. Her nasopharynx was clear with a small amount of adenoid tissue present.  It was nonobstructive.  Examination of the base of tongue, piriform sinuses, vallecula, lingual and laryngeal surfaces of the epiglottis, aryepiglottic folds, interarytenoid area, arytenoid cartilages, false vocal cords and ventricle were all normal. There was erythema of the posterior portion of both true vocal cords without nodules or polyps. The immediate subglottis appeared clear.  Impression:  Normal examination  with the exception of laryngitis with inflammation of true vocal cords which is most likely from chronic cough.  No lesions are present.     Assessment:   Shadia was seen today for cough, sore throat and other.    Diagnoses and all orders for this visit:    Hoarseness    Seasonal allergic rhinitis due to pollen  -     methylPREDNISolone acetate injection 60 mg  -     montelukast (SINGULAIR) 10 mg tablet; Take 1 tablet (10 mg total) by mouth every evening.    Acute bacterial rhinosinusitis  -     methylPREDNISolone acetate injection 60 mg  -     doxycycline (VIBRAMYCIN) 100 MG Cap; Take 1 capsule (100 mg total) by mouth 2 (two) times daily. Take with meals. for 14 days    Bronchitis, allergic, unspecified asthma severity, uncomplicated  -     methylPREDNISolone acetate injection 60 mg  -     montelukast (SINGULAIR) 10 mg tablet; Take 1 tablet (10 mg total) by mouth every evening.  -     doxycycline (VIBRAMYCIN) 100 MG Cap; Take 1 capsule (100 mg total) by mouth 2 (two) times daily. Take with meals. for 14 days    Cough  -     methylPREDNISolone acetate injection 60 mg  -     montelukast (SINGULAIR) 10 mg tablet; Take 1 tablet (10 mg total) by mouth every evening.  -     benzonatate (TESSALON) 100 MG capsule; Take 1 capsule (100 mg total) by mouth every 6 (six) hours as needed for Cough.          Plan:   she has allergic rhinitis and allergic bronchitis complicated by sinusitis.  I ordered Depo-Medrol IM, doxycycline b.i.d. for 2 weeks, C&S of her nose, Mucinex over-the-counter with hydration, Ventolin and Nasonex as needed (which she already has).  I refilled her Tessalon for cough.  She already has laryngitis from chronic cough which is the finding on her laryngeal examination.  I asked her not to take any antihistamine medications.  I placed her instead on Singulair daily.  I will see her back in 3 weeks and possibly reexamine her larynx at that time depending on her treatment success.  We will contact her with  the results of her culture.    No Follow-up on file.

## 2018-10-26 NOTE — LETTER
October 26, 2018      Gerardo Malcolm Jr., MD  [address] [facility] [address]           [facility]  [address] [facility] [address]  [facility] [address]  Phone: [phone]          Patient: Shadia Johnson   MR Number: [id number]   YOB: 1985   Date of Visit: 10/26/2018       Dear Dr. Gerardo Malcolm Jr.:    Thank you for referring Shadia Johnson to me for evaluation. Attached you will find relevant portions of my assessment and plan of care.    If you have questions, please do not hesitate to call me. I look forward to following Shadia Johnson along with you.    Sincerely,    Malachi Rodriguez MD    Enclosure  CC:  No Recipients    If you would like to receive this communication electronically, please contact [email or web address] or [phone] to request more information on [employer] access.    For providers and/or their staff who would like to refer a patient to [facility], please contact us through our one-stop-shop provider referral line, [facility] [place], at [phone].    If you feel you have received this communication in error or would no longer like to receive these types of communications, please e-mail [email or web address]

## 2018-10-28 LAB — BACTERIA NPH AEROBE CULT: NORMAL

## 2018-10-30 ENCOUNTER — PES CALL (OUTPATIENT)
Dept: ADMINISTRATIVE | Facility: CLINIC | Age: 33
End: 2018-10-30

## 2018-11-03 ENCOUNTER — INITIAL CONSULT (OUTPATIENT)
Dept: BARIATRICS | Facility: CLINIC | Age: 33
End: 2018-11-03
Payer: MEDICARE

## 2018-11-03 VITALS
HEART RATE: 96 BPM | DIASTOLIC BLOOD PRESSURE: 72 MMHG | HEIGHT: 65 IN | WEIGHT: 270.5 LBS | SYSTOLIC BLOOD PRESSURE: 114 MMHG | BODY MASS INDEX: 45.07 KG/M2

## 2018-11-03 DIAGNOSIS — K21.9 GASTROESOPHAGEAL REFLUX DISEASE, ESOPHAGITIS PRESENCE NOT SPECIFIED: ICD-10-CM

## 2018-11-03 DIAGNOSIS — I47.10 PSVT (PAROXYSMAL SUPRAVENTRICULAR TACHYCARDIA): ICD-10-CM

## 2018-11-03 DIAGNOSIS — E66.01 OBESITY, CLASS III, BMI 40-49.9 (MORBID OBESITY): Primary | ICD-10-CM

## 2018-11-03 PROCEDURE — 3078F DIAST BP <80 MM HG: CPT | Mod: CPTII,HCNC,S$GLB, | Performed by: INTERNAL MEDICINE

## 2018-11-03 PROCEDURE — 3074F SYST BP LT 130 MM HG: CPT | Mod: CPTII,HCNC,S$GLB, | Performed by: INTERNAL MEDICINE

## 2018-11-03 PROCEDURE — 99215 OFFICE O/P EST HI 40 MIN: CPT | Mod: HCNC,S$GLB,, | Performed by: INTERNAL MEDICINE

## 2018-11-03 PROCEDURE — 3008F BODY MASS INDEX DOCD: CPT | Mod: CPTII,HCNC,S$GLB, | Performed by: INTERNAL MEDICINE

## 2018-11-03 PROCEDURE — 99999 PR PBB SHADOW E&M-EST. PATIENT-LVL III: CPT | Mod: PBBFAC,HCNC,, | Performed by: INTERNAL MEDICINE

## 2018-11-03 RX ORDER — TOPIRAMATE 50 MG/1
50 TABLET, FILM COATED ORAL 2 TIMES DAILY
Qty: 60 TABLET | Refills: 3 | Status: SHIPPED | OUTPATIENT
Start: 2018-11-03 | End: 2019-02-18 | Stop reason: SDUPTHER

## 2018-11-03 NOTE — PATIENT INSTRUCTIONS
Patient was informed that topiramate is used for migraine prevention and seizures. Weight loss is a common side effect that is well documented. She understands this. She was informed of the potential side effects such as serious and possibly fatal rash in which case the medication should be discontinued immediately. Paresthesias, forgetfulness, fatigue, kidney stones, GI symptoms, and changes in lab values such as electrolytes, blood counts and kidney function.      Start topiramate  in the evening for 1 week, then morning and evening.       No soda, sweet tea, juices or lemonade      1350 calories a day  30% carbs (101 grams)  30 % fat (45 grams)  40 % protein (135 grams)  35 min exercise 3 x weekly  Food diary- [employer] [place] (code 98751), Lose it.       Fruits and Vegetables       Include 1-2 servings of fruit daily.      1 serving of fruit includes ½ cup unsweetened applesauce, ½ medium banana, tennis ball size piece of fruit, 17 grapes, 1 cup melon, 1 cup strawberries, ¼ cup dried fruit     Include 2-3 servings of vegetables daily. 1 serving is 1 cup raw or ½ cup cooked.     Non-starchy vegetables include artichoke, asparagus, baby corn, bamboo shoots, beans: green/Italian/wax, bean sprouts, beets, broccoli, [place] sprouts, cabbage, carrots, cauliflower, celery, cucumber, eggplant, green onions or scallions, greens, jicama, leeks, mushrooms, okra, onions, pea pods, peppers, radishes, spinach, summer squash, tomatoes and salsa, turnips, vegetable juice cocktail, water chestnuts, zucchini        1200- 1500 calorie Sample meal plan   80-120g protein per day.   Protein drinks and bars: 0-4 grams sugar   Drink lots of water throughout the day and exercise!   MENU # 1   Breakfast: 2 eggs, 1 turkey sausage ellis, 1 apple   Snack: P3 protein pack  Lunch: 2 roll-ups (sliced turkey, low-fat sliced cheese, mustard), 12 baby carrots dipped in 1 Tbsp natural peanut butter   Mid-Day Snack: ¼ cup unsalted almonds, ½ cup  fruit   Dinner: 1 chicken thigh simmered in tomato sauce + 2 Tbsp mozzarella cheese, ½ cup black beans, 1/2 cup steamed carrots   Evening Snack: 1/2 cup grapes with 4 cubes low-fat cheddar cheese   ___________________________________________________   MENU # 2   Breakfast: 200 calorie protein drink   Mid-morning snack : ¼ cup unsalted nuts, medium banana   Lunch: 3oz tuna or chicken salad made with 2 tbsp light downing, over salad with tomatoes and cucumbers.   Snack: low-fat cheese stick   Dinner: 3oz hamburger ellis, slice of low-fat cheese, 1 cup yellow squash and zucchini sauteed in nonstick cookspray  Snack: 6oz light yogurt   _______________________________________________________   Menu #3   Breakfast: 6oz plain Greek yogurt + fruit (½ banana OR ½ cup fruit - pineapple, blueberries, strawberries, peach), may add Splenda to jeanna.   Lunch: Grilled chicken breast w/ slice low-fat pepper sanjay cheese, 1/2 cup grilled/baked asparagus and small salad with Salad Spritzer.   Mid-Day snack: 200 calorie protein drink   Dinner: 4oz Grilled fish, ½ cup white beans, ½ cup cooked spinach   Evening Snack: fudgsicle no-sugar-added   Menu # 4   Breakfast: 1 scoop vanilla protein powder + 4oz skim milk + 4oz coffee   Snack: Pure protein bar   Lunch: 2 Lettuce tacos: 3oz seasoned ground turkey wrapped in a [place] lettuce leaves with 1 Tbsp shredded cheese and dollop low-fat sour cream   Snack: ½ cup cottage cheese, ½ cup pineapple chunks   Dinner: Shrimp omelet: 2 eggs, ½ cup shrimp, green onions, and shredded cheese   ______________________________________________________   Menu #5   Breakfast: 1 cup low-fat cottage cheese, ½ cup peaches (no sugar added)   Snack: 1 apple, 4 cubes cheese   Lunch: 3oz baked pork chop, 1 cup okra and tomato stew   Snack: 1/2 cup black beans + salsa + dollop light sour cream   Dinner: Caprese chicken salad: 3 oz chicken breast, 1oz fresh mozzarella, sliced tomato, 1 Tbsp olive oil, basil   Snack:  sugar-free popsicle   _______________________________________________________  Menu #6   Breakfast: ½ cup part-skim ricotta cheese, 2 Tbsp sugar-free strawberry preserves, sprinkle of slivered almonds   Snack: 1 orange + string cheese  Lunch: 3 oz canned chicken, 1oz shredded cheddar cheese, ½ cup black beans, 2 Tbsp salsa   Snack: 200 calorie Protein drink   Dinner: 4 oz grilled salmon steak, over mixed green salad with 2 tbsp light dressing   _______________________________________________________  Menu #7  Breakfast: crust-less quiche (bake 1 egg mixed w/ low fat cheese, broccoli and low sodium ham in a muffin cup until cooked inside)  Snack: 1 light yogurt  Lunch: protein shake (1 scoop powder + 8 oz skim milk, blended w/ ice)  Snack: small apple w/ 2 tbsp PB2 (powdered peanut butter)  Dinner: 2 Turkey meatballs w/ low sugar marinara sauce, 1/2 cup spiralized zucchini (sauteed on stove top w/ nonstick cookspray)        Meal Ideas for Regular Bariatric Diet  *Recipes and products available at [email or web address].com      Breakfast: (15-20g protein)    - Egg white omelet: 2 egg whites or ½ cup Egg Beaters. (Optional proteins: cheese, shrimp, black beans, chicken, sliced turkey) (Optional veggies: tomatoes, salsa, spinach, mushrooms, onions, green peppers, or small slice avocado)     - Egg and sausage: 1 egg or ¼ cup Egg Beaters (any variety), with 1 ellis or 2 links of Turkey sausage or Veggie breakfast sausage (Ladonna Farms or Boca)    - Crust-less breakfast quiche: To make a glass pie dish, mix 4oz part skim Ricotta, 1 cup skim milk, and 2 eggs as your base. Add protein: shredded cheese, sliced lean ham or turkey, turkey aleman/sausage. Add veggies: tomato, onion, green onion, mushroom, green pepper, spinach, etc.    - Yogurt parfait: Mix 1 - 6oz container Dannon Light N Fit vanilla yogurt, with ¼ cup Kashi Go Lean cereal    - Cottage cheese and fruit: ½ cup part-skim cottage cheese or ricotta cheese topped  with fresh fruit or sugar free preserves     - Ana Valdivia's Vanilla Egg custard* (add 2 Tbsp instant coffee granules to make Cappuccino Custard*)    - Hi-Protein café latte (skim milk, decaf coffee, 1 scoop protein powder). Optional to add Sugar free syrup or extract flavoring.    Lunch: (20-30g protein)    - ½ cup Black bean soup (Homemade or Progresso), with ¼ cup shredded low-fat cheese. Top with chopped tomato or fresh salsa.     - Lean deli turkey breast and low-fat sliced cheese, mustard or light downing to moisten, rolled up together, or wrapped in a [place] lettuce leaf    - Chicken salad made from dinner leftovers, moisten with low-fat salad dressing or light downing. Also try leftover salmon, shrimp, tuna or boiled eggs. Serve ½ cup over dark green salad    - Fat-free canned refried beans, topped with ¼ cup shredded low-fat cheese. Top with chopped tomato or fresh salsa.     - Greek salad: Top mixed greens with 1-2oz grilled chicken, tomatoes, red onions, 2-3 kalamata olives, and sprinkle lightly with feta cheese. Spritz with Balsamic vinegar to taste.     - Crust-less lunch quiche: To make a glass pie dish, mix 4oz part skim Ricotta, 1 cup skim milk, and 2 eggs as your base. Add protein: shredded cheese, sliced lean ham or turkey, shrimp, chicken. Add veggies: tomato, onion, green onion, mushroom, green pepper, spinach, artichoke, broccoli, etc.    - Pizza bake: tomato sauce, low-fat shredded mozzarella and turkey pepperoni or Lopez aleman. Add any veggies.    - Cucumber crab bites: Spread ¼ cup crab dip (lump crabmeat + light cream cheese and green onions) over sliced cucumber.     - Chicken with light spinach and artichoke dip*: Puree in : 6oz cooked and drained spinach, 2 cloves garlic, 1 can cannelloni beans, ½ cup chopped green onions, 1 can drained artichoke hearts (not marinated in oil), lemon juice and basil. Mix in 2oz chopped up chicken.    Supper: (20-30g protein)    - Serve grilled  fish over dark green salad tossed with low-fat dressing, served with grilled asparagus robles     - Rotisserie chicken salad: served with sliced strawberries, walnuts, fat-free feta cheese crumbles and 1 tbsp Hares Own Light Raspberry Sunray Vinaigrette    - Shrimp cocktail: Dip cold boiled shrimp in homemade low-sugar cocktail sauce (1/2 cup Roma One Carb ketchup, 2 tbsp horseradish, 1/4 tsp hot sauce, 1 tsp Worcestershire sauce, 1 tbsp freshly-squeezed lemon juice). Serve with dark green salad, walnuts, and crumbled blue cheese drizzled with olive oil and Balsamic vinegar    - Tuna Melt: Spread tuna salad onto 2 thick slices of tomato. Top with low-fat cheese and broil until cheese is melted. May also be made with chicken salad of shrimp salad. Winder with different types of cheeses.    - Homemade low-fat Chili using extra lean ground beef or ground turkey. Top with shredded cheese and salsa as desired. May add dollop fat-free sour cream if desired    - Dinner Omelet with shrimp or chicken and onion, green peppers and chives.    - No noodle lasagna: Use sliced zucchini or eggplant in place of noodles.  Layer with part skim ricotta cheese and low sugar meat sauce (use very lean ground beef or ground turkey).    - Mexican chicken bake: Bake chunks of chicken breast or thigh with taco seasoning, Pace brand enchilada sauce, green onions and low-fat cheese. Serve with ¼ cup black beans or fat free refried beans topped with chopped tomatoes or salsa.    - Vadim frozen meatballs, simmered in Classico Marinara sauce. Different flavors of salsa or spaghetti sauce create different dishes! Sprinkle with parmesan cheese. Serve with grilled or steamed veggies, or a dark green salad.    - Simmer boneless skinless chicken thigh chunks in Classico Marinara sauce or roasted salsa until tender with chopped onion, bell pepper, garlic, mushrooms, spinach, etc.     - Hamburger, without the bun, dressed the way you like.  Served with grilled or steamed veggies.    - Eggplant parmesan: Bake slices of eggplant at 350 degrees for 15 minutes. Layer tomato sauce, sliced eggplant and low-fat mozzarella cheese in a baking dish and cover with foil. Bake 30-40 more minutes or until bubbly. Uncover and bake at 400 degrees for about 15 more minutes, or until top is slightly crisp.    - Fish tacos: grilled/baked white fish, wrapped in [place] lettuce leaf, topped with salsa, shredded low-fat cheese, and light coleslaw.    Snacks: (100-200 calories; >5g protein)    - 1 low-fat cheese stick with 8 cherry tomatoes or 1 serving fresh fruit  - 4 thin slices fat-free turkey breast and 1 slice low-fat cheese  - 4 thin slices fat-free honey ham with wedge of melon  - 1/4 cup unsalted nuts with ½ cup fruit  - 6-oz container Dannon Light n Fit vanilla yogurt, topped with 1oz unsalted nuts         - apple, celery or baby carrots spread with 2 Tbsp natural peanut butter or almond butter   - apple slices with 1 oz slice low-fat cheese  - celery, cucumber, bell pepper or baby carrots dipped in ¼ cup hummus bean spread or light spinach and artichoke dip (*recipe in lunch section)  - 100 calorie bag microwave light popcorn with 3 tbsp grated parmesan cheese  - David Links Beef Steak - 14g protein! (similar to beef jerky)  - 2 wedges [place] - Light Herb & Garlic Cheese with sliced cucumber or green bell pepper  - 1/2 cup low-fat cottage cheese with ¼ cup fruit or ¼ cup salsa  - RTD Protein drinks: Atkins, Low Carb Slim Fast, EAS light, Muscle Milk Light, etc.  - Homemade Protein drinks: GNC Soy95, Isopure, Nectar, UNJURY, Whey Gourmet, etc. Mix 1 scoop powder with 8oz skim/1% milk or light soymilk.  - Protein bars: Atkins, EAS, Pure Protein, Think Thin, Detour, etc. Must have 0-4 grams sugar - Read the label.    Takeout Options: No more than twice/week  Deli - Salads (no pasta or rice), meats, cheeses. Roasted chicken. Lox (salmon)    Mexican - [facility]  which don't include tortillas, chips, or rice. Go easy on the beans. Example: Fajitas without the tortillas. Ask the  not to bring chips to the table if they are too tempting.    Greek - Meat or fish and vegetable, but no bread or rice. Including hummus, baba ganoush, etc, is OK. Most sit-down Greek restaurants can provide you with cucumber slices for dipping instead of yusuf bread.    Fast Food (Avoid as much as possible) - Salads (no croutons and limit salad dressing to 2 tbsp), grilled chicken sandwich without the bun and ask for no downing. Luzs low fat chili or Taco Bell pintos and cheese.    BBQ - The meats are fine if you ask for sauces on the side, but most of the traditional side dishes are loaded with carbs. Giuliano slaw, baked beans and BBQ sauce are typically made with sugar.    Chinese - Nothing deep-fried, no rice or noodles. Many Chinese sauces have starch and sugar in them, so you'll have to use your judgement. If you find that these sauces trigger cravings, or cause Dumping, you can ask for the sauce to be made without sugar or just use soy sauce.

## 2018-11-03 NOTE — PROGRESS NOTES
Subjective:       Patient ID: Shadia Johnson is a 33 y.o. female.    Chief Complaint: Consult    CC: weight    Current attempts at weight loss: New pt to me, referred by Gerardo Malcolm Jr., MD  [address] [place] [address] , with Patient Active Problem List:     Chronic pain     Depression     Interstitial cystitis     Menopausal state     Status post hysterectomy     Endometriosis     IC (interstitial cystitis)     Vaginitis     Pelvic adhesive disease     Pelvic pain in female     PSVT (paroxysmal supraventricular tachycardia)     Abnormally low high density lipoprotein (HDL) cholesterol with hypertriglyceridemia     Hormone replacement therapy (HRT)               No efforts currently.  Exercise with walking few times a week for 1 hour    Previous diet attempts: LCD, Cardiac diet, keto diet, IC diet,     History of medication for loss:   checked today. Phentermine and xenical in past. States did ok.     Heaviest weight: 270#    Lightest weight: 130#    Goal weight: 140#      Last eye exam:    Last year. No glaucoma per pt                                   Provider:    Typical eating patterns:  Disabled. Lives with , kids and parents. She and her mom cook.   Breakfast: May skip. Grits.     Lunch: sandwich. Weekends: same. May skip    Dinner: Spaghetti. Chicken with potatoes and corn. Turkey with cranberry sauce and carrots.     Snacks: peanuts,     Beverages: water, drAnderson Pepper- 3/day. Denies ETOH    Willingness to change:  8/10    EKG:Sinus tachycardia  Low voltage QRS  Borderline Abnormal ECG  When compared with ECG of 18-AUG-2016 09:01,  No significant change was found    CONCLUSIONS     1 - Normal left ventricular systolic function (EF 55-60%).     2 - Mild tricuspid regurgitation.     3 - Trivial mitral regurgitation.    BMR: 1975      Review of Systems   Constitutional: Negative for chills and fever.   Respiratory: Positive for shortness of breath.         + snores   Cardiovascular:  "Positive for palpitations. Negative for chest pain and leg swelling.   Gastrointestinal: Negative for constipation and diarrhea.        + GERD   Genitourinary: Positive for difficulty urinating and dysuria.   Musculoskeletal: Positive for back pain. Negative for arthralgias.   Neurological: Negative for dizziness and light-headedness.   Psychiatric/Behavioral: Positive for dysphoric mood. The patient is nervous/anxious.        Objective:     /72   Pulse 96   Ht 5' 5" (1.651 m)   Wt 122.7 kg (270 lb 8.1 oz)   BMI 45.01 kg/m²     Physical Exam   Constitutional: She is oriented to person, place, and time. She appears well-developed. No distress.   Morbidly obese     HENT:   Head: Normocephalic and atraumatic.   Eyes: EOM are normal. Pupils are equal, round, and reactive to light. No scleral icterus.   Neck: Normal range of motion. Neck supple. No thyromegaly present.   Cardiovascular: Normal rate and normal heart sounds. Exam reveals no gallop and no friction rub.   No murmur heard.  Pulmonary/Chest: Effort normal and breath sounds normal. No respiratory distress. She has no wheezes.   Abdominal: Soft. Bowel sounds are normal. She exhibits no distension. There is tenderness in the right lower quadrant and left lower quadrant.   Musculoskeletal: Normal range of motion. She exhibits no edema.   Neurological: She is alert and oriented to person, place, and time. No cranial nerve deficit.   Skin: Skin is warm and dry. No erythema.   Psychiatric: She has a normal mood and affect. Her behavior is normal. Judgment normal.   Vitals reviewed.      Assessment:       1. Obesity, Class III, BMI 40-49.9 (morbid obesity)    2. Gastroesophageal reflux disease, esophagitis presence not specified    3. PSVT (paroxysmal supraventricular tachycardia)        Plan:         1. Obesity, Class III, BMI 40-49.9 (morbid obesity)  Patient was informed that topiramate is used for migraine prevention and seizures. Weight loss is a common " side effect that is well documented. She understands this. She was informed of the potential side effects such as serious and possibly fatal rash in which case the medication should be discontinued immediately. Paresthesias, forgetfulness, fatigue, kidney stones, GI symptoms, and changes in lab values such as electrolytes, blood counts and kidney function.      Start topiramate  in the evening for 1 week, then morning and evening.       No soda, sweet tea, juices or lemonade      1350 calories a day  30% carbs (101 grams)  30 % fat (45 grams)  40 % protein (135 grams)  35 min exercise 3 x weekly  Food diary- [facility] [place] (code 03920), Lose it.     3535-9060 nader menu and meal ideas given. Nutrition materials provided today.      2. Gastroesophageal reflux disease, esophagitis presence not specified  Expect improvement with weight loss  Attempt to wean PPI once 10% TBW lost.       3. PSVT (paroxysmal supraventricular tachycardia)  Avoid stimulant anorectics

## 2018-11-03 NOTE — LETTER
November 3, 2018      Gerardo Malcolm Jr., MD  [address] [facility]  [place] [address]           [facility]ariat[facility] Surgery  [address]  [place] [address]  Phone: [phone]          Patient: Shadia Johnson   MR Number: [id number]   YOB: 1985   Date of Visit: 11/3/2018       Dear Dr. Gerardo Malcolm Jr.:    Thank you for referring Shadia Johnson to me for evaluation. Attached you will find relevant portions of my assessment and plan of care.    If you have questions, please do not hesitate to call me. I look forward to following Shadia Johnson along with you.    Sincerely,    Rosi Velazquez MD    Enclosure  CC:  No Recipients    If you would like to receive this communication electronically, please contact [email or web address] or [phone] to request more information on [employer] Link access.    For providers and/or their staff who would like to refer a patient to [facility], please contact us through our one-stop-shop provider referral line, [facility] , at [phone].    If you feel you have received this communication in error or would no longer like to receive these types of communications, please e-mail [email or web address]

## 2018-11-19 ENCOUNTER — OFFICE VISIT (OUTPATIENT)
Dept: OTOLARYNGOLOGY | Facility: CLINIC | Age: 33
End: 2018-11-19
Payer: MEDICARE

## 2018-11-19 VITALS
DIASTOLIC BLOOD PRESSURE: 80 MMHG | SYSTOLIC BLOOD PRESSURE: 110 MMHG | BODY MASS INDEX: 44.48 KG/M2 | HEIGHT: 65 IN | WEIGHT: 267 LBS

## 2018-11-19 DIAGNOSIS — J30.1 SEASONAL ALLERGIC RHINITIS DUE TO POLLEN: ICD-10-CM

## 2018-11-19 DIAGNOSIS — R05.9 COUGH: Primary | ICD-10-CM

## 2018-11-19 DIAGNOSIS — J32.9 CHRONIC SINUSITIS, UNSPECIFIED LOCATION: ICD-10-CM

## 2018-11-19 DIAGNOSIS — J40 BRONCHITIS: ICD-10-CM

## 2018-11-19 PROCEDURE — 3074F SYST BP LT 130 MM HG: CPT | Mod: CPTII,S$GLB,, | Performed by: OTOLARYNGOLOGY

## 2018-11-19 PROCEDURE — 3008F BODY MASS INDEX DOCD: CPT | Mod: CPTII,S$GLB,, | Performed by: OTOLARYNGOLOGY

## 2018-11-19 PROCEDURE — 3079F DIAST BP 80-89 MM HG: CPT | Mod: CPTII,S$GLB,, | Performed by: OTOLARYNGOLOGY

## 2018-11-19 PROCEDURE — 99212 OFFICE O/P EST SF 10 MIN: CPT | Mod: S$GLB,,, | Performed by: OTOLARYNGOLOGY

## 2018-11-19 NOTE — PATIENT INSTRUCTIONS
As we discussed, your culture was negative.  Since you do not have symptoms of gastroesophageal reflux disease, I am ordering a CT scan of your sinuses and also referring you to the pulmonologist for lung evaluation.    I will review your CT scan and the office will get back in touch with you after it is done.

## 2018-11-19 NOTE — PROGRESS NOTES
History of Present Illness:  Shadia Johnson presents to the clinic today for Cough (Chronic Cough)  .  She was referred here for chronic cough.  I saw her and placed her on doxycycline for 10 days, gave her Depo-Medrol 60 mg IM, and placed her on Singulair.  Also cultured her nose. She has no significant symptoms of GERD.  She seems to have chronic allergic bronchitis with chronic cough.  It is generally nonproductive.  She did not make any improvement at all on the treatment prescribed by me.  Her nasal culture only grew normal negrito.  She complains of thick mucus in her throat which I still believe may be coming from her sinuses.    Past Medical History:   Diagnosis Date    Anemia     Anxiety     Asthma     Childhood    Depression     Endometriosis of pelvis     Endometriosis, severe     Stage 4    History of endometriosis     Interstitial cystitis     Ulcer     hunners     Past Surgical History:   Procedure Laterality Date    COLONOSCOPY      EXCISION, MASS, ABDOMEN N/A 2013    [facility]    HYSTERECTOMY      endometriosis    interSTIM IMPLANT      LAPOROSCOPY      left salpingectomy      LYSIS, ADHESIONS N/A 2013    [facility]formed [facility] OR    MAMMOGRAM  2010    SALPINGO-OOPHORECTOMY, LAPAROSCOPIC Right 2013    [facility] OR    TRACHELECTOMY  12     Family History   Problem Relation Age of Onset    Alcohol abuse Mother     Cancer Maternal Grandmother         skin    Depression Maternal Grandmother     Heart disease Paternal Uncle     Hyperlipidemia Paternal Uncle        Social History     Tobacco Use    Smoking status: Never Smoker    Smokeless tobacco: Never Used    Tobacco comment: , .  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  Disabled, SSDI.  Kids: 9 & 2.   Substance Use Topics    Alcohol use: No    Drug use: No         Review of Systems     Nose:  Positive for postnasal  drip.    Constitutional: Negative for fever.    Respiratory:  Positive for recent cough. Negative for shortness of breath.    Gastrointestinal:  Negative for acid reflux.       Physical Exam:    Constitutional:   Vital signs are normal. She appears well-developed and well-nourished. She is active.     Head:  Normocephalic. Salivary glands normal.      Ears:  Hearing normal to normal and whispered voice; external ear normal without scars, lesions, or masses; ear canal, tympanic membrane, and middle ear normal..     Nose:    Her nasal lining is inflamed in her turbinates are swollen.    Mouth/Throat  Lips, teeth, and gums normal and oropharynx normal.     Neck:  Neck normal without thyromegaly masses, asymmetry, normal tracheal structure, crepitus, and tenderness.                      Assessment:   Shadia was seen today for cough.    Diagnoses and all orders for this visit:    Cough  -     Ambulatory Referral to Pulmonology    Seasonal allergic rhinitis due to pollen    Bronchitis  -     X-Ray Chest PA And Lateral; Future    Chronic sinusitis, unspecified location  -     CT [employer] Sinuses without; Future          Plan:   she has a chronic cough which bothers her 24 hr a day has been going on since September.  She seems to have allergic bronchitis and may have chronic sinusitis.  The culture that I obtained was only indicative of normal negrito.  She has no symptoms of reflux disease. I am ordering a CT scan of her sinuses and a chest x-ray and a pulmonary consultation.  I have discussed this with her in detail.  Further treatment will be initiated by me after the CT scan.    Follow-up in about 2 weeks (around 12/3/2018), or after CT sinuses.

## 2019-01-17 DIAGNOSIS — R05.3 CHRONIC COUGH: ICD-10-CM

## 2019-01-17 DIAGNOSIS — Z79.890 PREMATURE SURGICAL MENOPAUSE ON HRT: ICD-10-CM

## 2019-01-17 DIAGNOSIS — N95.1 MENOPAUSAL STATE: Primary | ICD-10-CM

## 2019-01-17 DIAGNOSIS — E89.40 PREMATURE SURGICAL MENOPAUSE ON HRT: ICD-10-CM

## 2019-01-17 DIAGNOSIS — Z90.710 STATUS POST HYSTERECTOMY: ICD-10-CM

## 2019-01-17 RX ORDER — ESTRADIOL VALERATE 20 MG/ML
INJECTION INTRAMUSCULAR
Qty: 5 ML | Refills: 4 | Status: SHIPPED | OUTPATIENT
Start: 2019-01-17 | End: 2019-02-19 | Stop reason: SDUPTHER

## 2019-01-17 RX ORDER — OMEPRAZOLE 20 MG/1
20 CAPSULE, DELAYED RELEASE ORAL DAILY
Qty: 30 CAPSULE | Refills: 2 | Status: SHIPPED | OUTPATIENT
Start: 2019-01-17 | End: 2019-01-17 | Stop reason: SDUPTHER

## 2019-01-17 RX ORDER — OMEPRAZOLE 20 MG/1
20 CAPSULE, DELAYED RELEASE ORAL DAILY
Qty: 30 CAPSULE | Refills: 2 | Status: CANCELLED | OUTPATIENT
Start: 2019-01-17

## 2019-01-17 RX ORDER — ESTRADIOL VALERATE 20 MG/ML
INJECTION INTRAMUSCULAR
Qty: 5 ML | Refills: 4 | Status: SHIPPED | OUTPATIENT
Start: 2019-01-17 | End: 2019-01-17 | Stop reason: SDUPTHER

## 2019-01-17 RX ORDER — ESTRADIOL VALERATE 20 MG/ML
INJECTION INTRAMUSCULAR
Qty: 5 ML | Refills: 4 | Status: SHIPPED | OUTPATIENT
Start: 2019-01-17 | End: 2021-04-21

## 2019-01-17 RX ORDER — ALBUTEROL SULFATE 90 UG/1
AEROSOL, METERED RESPIRATORY (INHALATION)
Qty: 36 G | Refills: 5 | Status: SHIPPED | OUTPATIENT
Start: 2019-01-17 | End: 2019-02-18 | Stop reason: SDUPTHER

## 2019-01-17 RX ORDER — ESTRADIOL VALERATE 20 MG/ML
INJECTION INTRAMUSCULAR
Qty: 5 ML | Refills: 4 | Status: CANCELLED | OUTPATIENT
Start: 2019-01-17

## 2019-01-22 RX ORDER — OMEPRAZOLE 20 MG/1
CAPSULE, DELAYED RELEASE ORAL
Qty: 30 CAPSULE | Refills: 2 | Status: SHIPPED | OUTPATIENT
Start: 2019-01-22 | End: 2022-03-29

## 2019-02-18 DIAGNOSIS — R05.3 CHRONIC COUGH: ICD-10-CM

## 2019-02-18 RX ORDER — ALBUTEROL SULFATE 90 UG/1
AEROSOL, METERED RESPIRATORY (INHALATION)
Qty: 36 G | Refills: 5 | Status: SHIPPED | OUTPATIENT
Start: 2019-02-18 | End: 2019-10-19 | Stop reason: SDUPTHER

## 2019-02-18 RX ORDER — TOPIRAMATE 50 MG/1
50 TABLET, FILM COATED ORAL 2 TIMES DAILY
Qty: 60 TABLET | Refills: 0 | Status: SHIPPED | OUTPATIENT
Start: 2019-02-18 | End: 2022-03-29

## 2019-02-18 RX ORDER — OMEPRAZOLE 20 MG/1
20 CAPSULE, DELAYED RELEASE ORAL DAILY
Qty: 30 CAPSULE | Refills: 2 | Status: CANCELLED | OUTPATIENT
Start: 2019-02-18

## 2019-02-19 DIAGNOSIS — N95.1 MENOPAUSAL STATE: Primary | ICD-10-CM

## 2019-02-19 DIAGNOSIS — E89.40 PREMATURE SURGICAL MENOPAUSE ON HRT: ICD-10-CM

## 2019-02-19 DIAGNOSIS — Z79.890 PREMATURE SURGICAL MENOPAUSE ON HRT: ICD-10-CM

## 2019-02-19 RX ORDER — ESTRADIOL VALERATE 20 MG/ML
INJECTION INTRAMUSCULAR
Qty: 5 ML | Refills: 2 | Status: SHIPPED | OUTPATIENT
Start: 2019-02-19 | End: 2021-04-21

## 2019-02-21 RX ORDER — METOPROLOL SUCCINATE 25 MG/1
25 TABLET, EXTENDED RELEASE ORAL DAILY
Qty: 90 TABLET | Refills: 0 | OUTPATIENT
Start: 2019-02-21

## 2019-04-17 ENCOUNTER — OFFICE VISIT (OUTPATIENT)
Dept: FAMILY MEDICINE | Facility: CLINIC | Age: 34
End: 2019-04-17
Payer: MEDICARE

## 2019-04-17 VITALS
HEIGHT: 65 IN | BODY MASS INDEX: 46.8 KG/M2 | DIASTOLIC BLOOD PRESSURE: 82 MMHG | TEMPERATURE: 98 F | SYSTOLIC BLOOD PRESSURE: 129 MMHG | WEIGHT: 280.88 LBS

## 2019-04-17 DIAGNOSIS — E66.01 MORBID OBESITY: Primary | ICD-10-CM

## 2019-04-17 DIAGNOSIS — Z01.818 PREOPERATIVE EXAMINATION: ICD-10-CM

## 2019-04-17 PROCEDURE — 3074F SYST BP LT 130 MM HG: CPT | Mod: HCNC,CPTII,S$GLB, | Performed by: FAMILY MEDICINE

## 2019-04-17 PROCEDURE — 93000 EKG 12-LEAD: ICD-10-PCS | Mod: HCNC,S$GLB,, | Performed by: INTERNAL MEDICINE

## 2019-04-17 PROCEDURE — 3079F PR MOST RECENT DIASTOLIC BLOOD PRESSURE 80-89 MM HG: ICD-10-PCS | Mod: HCNC,CPTII,S$GLB, | Performed by: FAMILY MEDICINE

## 2019-04-17 PROCEDURE — 99999 PR PBB SHADOW E&M-EST. PATIENT-LVL III: ICD-10-PCS | Mod: PBBFAC,HCNC,, | Performed by: FAMILY MEDICINE

## 2019-04-17 PROCEDURE — 3008F BODY MASS INDEX DOCD: CPT | Mod: HCNC,CPTII,S$GLB, | Performed by: FAMILY MEDICINE

## 2019-04-17 PROCEDURE — 3079F DIAST BP 80-89 MM HG: CPT | Mod: HCNC,CPTII,S$GLB, | Performed by: FAMILY MEDICINE

## 2019-04-17 PROCEDURE — 93000 ELECTROCARDIOGRAM COMPLETE: CPT | Mod: HCNC,S$GLB,, | Performed by: INTERNAL MEDICINE

## 2019-04-17 PROCEDURE — 3074F PR MOST RECENT SYSTOLIC BLOOD PRESSURE < 130 MM HG: ICD-10-PCS | Mod: HCNC,CPTII,S$GLB, | Performed by: FAMILY MEDICINE

## 2019-04-17 PROCEDURE — 99214 PR OFFICE/OUTPT VISIT, EST, LEVL IV, 30-39 MIN: ICD-10-PCS | Mod: HCNC,S$GLB,, | Performed by: FAMILY MEDICINE

## 2019-04-17 PROCEDURE — 99999 PR PBB SHADOW E&M-EST. PATIENT-LVL III: CPT | Mod: PBBFAC,HCNC,, | Performed by: FAMILY MEDICINE

## 2019-04-17 PROCEDURE — 99499 UNLISTED E&M SERVICE: CPT | Mod: HCNC,S$GLB,, | Performed by: FAMILY MEDICINE

## 2019-04-17 PROCEDURE — 3008F PR BODY MASS INDEX (BMI) DOCUMENTED: ICD-10-PCS | Mod: HCNC,CPTII,S$GLB, | Performed by: FAMILY MEDICINE

## 2019-04-17 PROCEDURE — 99214 OFFICE O/P EST MOD 30 MIN: CPT | Mod: HCNC,S$GLB,, | Performed by: FAMILY MEDICINE

## 2019-04-17 PROCEDURE — 99499 RISK ADDL DX/OHS AUDIT: ICD-10-PCS | Mod: HCNC,S$GLB,, | Performed by: FAMILY MEDICINE

## 2019-04-17 RX ORDER — MONTELUKAST SODIUM 10 MG/1
TABLET ORAL
COMMUNITY
Start: 2019-03-11 | End: 2022-03-29

## 2019-04-17 NOTE — PROGRESS NOTES
Routine Office Visit    Patient Name: Shadia Johnson    : 1985  MRN: [id number]    Subjective:  Shadia is a 33 y.o. female who presents today for:   Chief Complaint   Patient presents with    Surgical Consult     33-year-old female with history of tachycardia controlled with Toprol XL comes in for preoperative examination for clearance for bariatric surgery.  She reports no acute concerns.  She reports that she recently did lab test at [facility] in [place].  She states that she does not have a surgery date yet but was advised to have her medical clearance done 1st.  Surgery will be done by Dr. Chandra Pagan MD    Past Medical History  Past Medical History:   Diagnosis Date    Anemia     Anxiety     Asthma     Childhood    Depression     Endometriosis of pelvis     Endometriosis, severe     Stage 4    History of endometriosis     Interstitial cystitis     Ulcer     hunners       Past Surgical History  Past Surgical History:   Procedure Laterality Date    COLONOSCOPY      EXCISION, MASS, ABDOMEN N/A 2013    [facility]    HYSTERECTOMY      endometriosis    interSTIM IMPLANT      LAPOROSCOPY      left salpingectomy      LYSIS, ADHESIONS N/A 2013    [facility]    MAMMOGRAM  2010    SALPINGO-OOPHORECTOMY, LAPAROSCOPIC Right 2013    [facility]    TRACHELECTOMY  12        Family History  Family History   Problem Relation Age of Onset    Alcohol abuse Mother     Cancer Maternal Grandmother         skin    Depression Maternal Grandmother     Heart disease Paternal Uncle     Hyperlipidemia Paternal Uncle        Social History  Social History     Socioeconomic History    Marital status:      Spouse name: Not on file    Number of children: Not on file    Years of education: Not on file    Highest education level: Not on file   Occupational History    Not on file    Social Needs    Financial resource strain: Not on file    Food insecurity:     Worry: Not on file     Inability: Not on file    Transportation needs:     Medical: Not on file     Non-medical: Not on file   Tobacco Use    Smoking status: Never Smoker    Smokeless tobacco: Never Used    Tobacco comment: , .  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  Disabled, SSDI.  Kids: 9 & 2.   Substance and Sexual Activity    Alcohol use: No    Drug use: No    Sexual activity: Yes     Partners: Male     Birth control/protection: Surgical     Comment: : Todd  ( at hot).    Lifestyle    Physical activity:     Days per week: Not on file     Minutes per session: Not on file    Stress: Not on file   Relationships    Social connections:     Talks on phone: Not on file     Gets together: Not on file     Attends Judaism service: Not on file     Active member of club or organization: Not on file     Attends meetings of clubs or organizations: Not on file     Relationship status: Not on file   Other Topics Concern    Not on file   Social History Narrative    .  Occup:  Disabled.         Current Medications  Current Outpatient Medications on File Prior to Visit   Medication Sig Dispense Refill    albuterol (VENTOLIN HFA) 90 mcg/actuation inhaler INHALE 2 puffs into the lungs EVERY 6 HOURS AS NEEDED 36 g 5    estradiol valerate (DELESTROGEN) 20 mg/mL injection INJECT 1 ml EVERY 30 days 5 mL 4    ESTRING 2 mg (7.5 mcg /24 hour) vaginal ring       metoprolol succinate (TOPROL-XL) 25 MG 24 hr tablet Take 1 tablet (25 mg total) by mouth once daily. 90 tablet 0    omeprazole (PRILOSEC) 20 MG capsule TAKE ONE Capsule BY MOUTH once DAILY 30 capsule 2    oxybutynin (DITROPAN-XL) 10 MG 24 hr tablet       estradiol valerate (DELESTROGEN) 20 mg/mL injection INJECT 1 ml EVERY 30 days 5 mL 2    montelukast (SINGULAIR) 10 mg tablet       topiramate (TOPAMAX) 50 MG tablet Take 1 tablet (50 mg total) by  "mouth 2 (two) times daily. 60 tablet 0     No current facility-administered medications on file prior to visit.        Allergies   Review of patient's allergies indicates:  No Known Allergies    Review of Systems   Constitutional: Negative for chills and fever.   Respiratory: Negative for shortness of breath and wheezing.    Cardiovascular: Negative for chest pain and palpitations.   Gastrointestinal: Negative for abdominal pain, blood in stool, constipation, diarrhea and nausea.   Genitourinary: Negative for dysuria.   Musculoskeletal: Negative for back pain.   Skin: Negative for rash.     /82 (BP Location: Left arm, Patient Position: Sitting, BP Method: Medium (Manual))   Temp 98 °F (36.7 °C) (Oral)   Ht 5' 5" (1.651 m)   Wt 127.4 kg (280 lb 13.9 oz)   BMI 46.74 kg/m²     Physical Exam   Constitutional: She appears well-developed and well-nourished.   HENT:   Head: Normocephalic and atraumatic.   Right Ear: External ear normal.   Left Ear: External ear normal.   Nose: Nose normal.   Mouth/Throat: Oropharynx is clear and moist. No oropharyngeal exudate.   Eyes: Pupils are equal, round, and reactive to light. Conjunctivae and EOM are normal. Right eye exhibits no discharge. Left eye exhibits no discharge.   Neck: Normal range of motion. Neck supple. No tracheal deviation present.   Cardiovascular: Normal rate, regular rhythm, normal heart sounds and intact distal pulses.   No murmur heard.  Pulmonary/Chest: Effort normal and breath sounds normal. She has no wheezes. She has no rales.   Abdominal: Soft. Bowel sounds are normal. She exhibits no mass. There is no tenderness.   Lymphadenopathy:     She has no cervical adenopathy.   Psychiatric: She has a normal mood and affect.   Vitals reviewed.    Assessment/Plan:  Shadia was seen today for surgical consult.    Diagnoses and all orders for this visit:    Morbid obesity  -     IN OFFICE EKG 12-LEAD (to Muse)    Preoperative examination  -     IN OFFICE EKG " 12-LEAD (to Muse)     EKG done in the office.  Will await lab results from [facility].  After review of these, if appropriate will send in surgical clearance to Dr. Pagan at [facility] fax [phone] or [phone]; telephone [phone]      This office note has been dictated.  This dictation has been generated using M-Modal Fluency Direct dictation; some phonetic errors may occur.

## 2019-04-23 ENCOUNTER — TELEPHONE (OUTPATIENT)
Dept: FAMILY MEDICINE | Facility: CLINIC | Age: 34
End: 2019-04-23

## 2019-04-23 NOTE — TELEPHONE ENCOUNTER
Please call patient and inform her that I have been trying to contact her in regards to her medical clearance as I need to contact her prior to faxing it over, because of her lab results

## 2019-04-24 ENCOUNTER — TELEPHONE (OUTPATIENT)
Dept: FAMILY MEDICINE | Facility: CLINIC | Age: 34
End: 2019-04-24

## 2019-04-24 NOTE — TELEPHONE ENCOUNTER
Call back information confirmed because Dr Malcolm has tried several times to call patient back in regards to clearance and she confirmed call back number is correct and was asked to attempt to answer the call when Dr Malcolm calls

## 2019-04-29 ENCOUNTER — TELEPHONE (OUTPATIENT)
Dept: FAMILY MEDICINE | Facility: CLINIC | Age: 34
End: 2019-04-29

## 2019-04-29 NOTE — TELEPHONE ENCOUNTER
Called patient by phone and identity confirmed. Spoke with patient in regards to her lab tests. I asked her if she has ever had any problems with her sugars and she states that no. She fasting glucose was 154 and her A1c was 7.7.  I informed her that this is consistent with diabetes and needs to be followed.   Bariatric surgery can help with this, however, I strongly encouraged her to make an appointment to follow up in the office within 2 weeks of her surgery. I discussed with her that diabetes does present it's own set of complications.

## 2019-04-29 NOTE — TELEPHONE ENCOUNTER
Patient is medically stable and is low risk patient, and medically stable for planned surgery, and may proceed with elective bariatric surgery.

## 2019-05-28 RX ORDER — METOPROLOL SUCCINATE 25 MG/1
TABLET, EXTENDED RELEASE ORAL
Qty: 90 TABLET | Refills: 0 | OUTPATIENT
Start: 2019-05-28

## 2019-06-13 RX ORDER — METOPROLOL SUCCINATE 25 MG/1
TABLET, EXTENDED RELEASE ORAL
Qty: 90 TABLET | Refills: 0 | OUTPATIENT
Start: 2019-06-13

## 2019-06-25 ENCOUNTER — PES CALL (OUTPATIENT)
Dept: ADMINISTRATIVE | Facility: CLINIC | Age: 34
End: 2019-06-25

## 2019-07-25 DIAGNOSIS — Z90.710 STATUS POST HYSTERECTOMY: ICD-10-CM

## 2019-07-25 DIAGNOSIS — N95.1 MENOPAUSAL STATE: Primary | ICD-10-CM

## 2019-07-25 RX ORDER — ESTRADIOL 2 MG/1
2 SYSTEM VAGINAL ONCE
Qty: 1 EACH | Refills: 0 | Status: SHIPPED | OUTPATIENT
Start: 2019-07-25 | End: 2019-07-25

## 2019-07-25 RX ORDER — ESTRADIOL 2 MG/1
1 SYSTEM VAGINAL
Qty: 1 EACH | Refills: 0 | Status: SHIPPED | OUTPATIENT
Start: 2019-07-25 | End: 2021-04-21

## 2019-10-21 DIAGNOSIS — Z79.890 PREMATURE SURGICAL MENOPAUSE ON HRT: ICD-10-CM

## 2019-10-21 DIAGNOSIS — E89.40 PREMATURE SURGICAL MENOPAUSE ON HRT: ICD-10-CM

## 2019-10-21 RX ORDER — ALBUTEROL SULFATE 90 UG/1
AEROSOL, METERED RESPIRATORY (INHALATION)
Qty: 36 G | Refills: 5 | Status: SHIPPED | OUTPATIENT
Start: 2019-10-21 | End: 2020-03-05 | Stop reason: SDUPTHER

## 2019-10-21 RX ORDER — METOPROLOL SUCCINATE 25 MG/1
25 TABLET, EXTENDED RELEASE ORAL DAILY
Qty: 90 TABLET | Refills: 0 | OUTPATIENT
Start: 2019-10-21

## 2019-10-21 NOTE — TELEPHONE ENCOUNTER
10/221/19 @ 1538  PT IS REQUESTING A REFILL ON THE FOLLOWING MEDICATIONS      Shadia Johnson would like a refill of the following medications:         estradiol valerate (DELESTROGEN) 20 mg/mL injection [Jose J Plata MD]         estradiol valerate (DELESTROGEN) 20 mg/mL injection [Jose J Plata MD]         ESTRING 2 mg (7.5 mcg /24 hour) vaginal ring [Jose J Plata MD]     Preferred pharmacy: [id number] [place], [place] - [address] AT [facility] [address]   Delivery method: Pickup       Medication renewals requested in this message routed separately:         albuterol (VENTOLIN HFA) 90 mcg/actuation inhaler [Ja Saul MD]           metoprolol succinate (TOPROL-XL) 25 MG 24 hr tablet [Rene Muse MD

## 2019-10-22 RX ORDER — ESTRADIOL VALERATE 20 MG/ML
INJECTION INTRAMUSCULAR
Qty: 5 ML | Refills: 4 | OUTPATIENT
Start: 2019-10-22

## 2019-10-22 RX ORDER — ESTRADIOL VALERATE 20 MG/ML
INJECTION INTRAMUSCULAR
Qty: 5 ML | Refills: 2 | OUTPATIENT
Start: 2019-10-22

## 2019-10-22 RX ORDER — ESTRADIOL 2 MG/1
1 SYSTEM VAGINAL
Qty: 1 EACH | Refills: 0 | OUTPATIENT
Start: 2019-10-22

## 2019-10-22 NOTE — TELEPHONE ENCOUNTER
10/22/19 @ 0900  .CALL ATTEMPT TO PT UNSUCCESSFUL,   UNABLE TO LEAVE A  MESSAGE FOR PT TO RETURN CALL TO OFFICE CONCERNING DR REQUESTING PT MAKE APPT SO HE CAN DISCUSS HER MEDICATION WITH HER, BECAUSE HER VOICE MAIL IS FULL

## 2019-11-06 ENCOUNTER — OFFICE VISIT (OUTPATIENT)
Dept: FAMILY MEDICINE | Facility: CLINIC | Age: 34
End: 2019-11-06
Payer: MEDICARE

## 2019-11-06 VITALS
BODY MASS INDEX: 39.78 KG/M2 | SYSTOLIC BLOOD PRESSURE: 146 MMHG | OXYGEN SATURATION: 100 % | DIASTOLIC BLOOD PRESSURE: 84 MMHG | HEART RATE: 80 BPM | HEIGHT: 65 IN | WEIGHT: 238.75 LBS | RESPIRATION RATE: 20 BRPM | TEMPERATURE: 98 F

## 2019-11-06 DIAGNOSIS — L65.9 HAIR LOSS: ICD-10-CM

## 2019-11-06 DIAGNOSIS — N64.4 BREAST PAIN, RIGHT: ICD-10-CM

## 2019-11-06 DIAGNOSIS — I47.10 PSVT (PAROXYSMAL SUPRAVENTRICULAR TACHYCARDIA): ICD-10-CM

## 2019-11-06 DIAGNOSIS — Z13.5 SCREENING FOR DIABETIC RETINOPATHY: ICD-10-CM

## 2019-11-06 DIAGNOSIS — I10 HYPERTENSION, BENIGN: ICD-10-CM

## 2019-11-06 DIAGNOSIS — Z23 NEED FOR VACCINATION: ICD-10-CM

## 2019-11-06 DIAGNOSIS — E11.9 DIABETES MELLITUS WITHOUT COMPLICATION: Primary | ICD-10-CM

## 2019-11-06 PROCEDURE — 3077F SYST BP >= 140 MM HG: CPT | Mod: HCNC,CPTII,S$GLB, | Performed by: FAMILY MEDICINE

## 2019-11-06 PROCEDURE — 3079F PR MOST RECENT DIASTOLIC BLOOD PRESSURE 80-89 MM HG: ICD-10-PCS | Mod: HCNC,CPTII,S$GLB, | Performed by: FAMILY MEDICINE

## 2019-11-06 PROCEDURE — G0008 ADMIN INFLUENZA VIRUS VAC: HCPCS | Mod: HCNC,S$GLB,, | Performed by: FAMILY MEDICINE

## 2019-11-06 PROCEDURE — 99999 PR PBB SHADOW E&M-EST. PATIENT-LVL V: ICD-10-PCS | Mod: PBBFAC,HCNC,, | Performed by: FAMILY MEDICINE

## 2019-11-06 PROCEDURE — 99214 PR OFFICE/OUTPT VISIT, EST, LEVL IV, 30-39 MIN: ICD-10-PCS | Mod: 25,HCNC,S$GLB, | Performed by: FAMILY MEDICINE

## 2019-11-06 PROCEDURE — 3077F PR MOST RECENT SYSTOLIC BLOOD PRESSURE >= 140 MM HG: ICD-10-PCS | Mod: HCNC,CPTII,S$GLB, | Performed by: FAMILY MEDICINE

## 2019-11-06 PROCEDURE — 99499 UNLISTED E&M SERVICE: CPT | Mod: HCNC,S$GLB,, | Performed by: FAMILY MEDICINE

## 2019-11-06 PROCEDURE — 3008F BODY MASS INDEX DOCD: CPT | Mod: HCNC,CPTII,S$GLB, | Performed by: FAMILY MEDICINE

## 2019-11-06 PROCEDURE — 99999 PR PBB SHADOW E&M-EST. PATIENT-LVL V: CPT | Mod: PBBFAC,HCNC,, | Performed by: FAMILY MEDICINE

## 2019-11-06 PROCEDURE — 3079F DIAST BP 80-89 MM HG: CPT | Mod: HCNC,CPTII,S$GLB, | Performed by: FAMILY MEDICINE

## 2019-11-06 PROCEDURE — G0008 FLU VACCINE (QUAD) GREATER THAN OR EQUAL TO 3YO PRESERVATIVE FREE IM: ICD-10-PCS | Mod: HCNC,S$GLB,, | Performed by: FAMILY MEDICINE

## 2019-11-06 PROCEDURE — 90686 IIV4 VACC NO PRSV 0.5 ML IM: CPT | Mod: HCNC,S$GLB,, | Performed by: FAMILY MEDICINE

## 2019-11-06 PROCEDURE — 99499 RISK ADDL DX/OHS AUDIT: ICD-10-PCS | Mod: HCNC,S$GLB,, | Performed by: FAMILY MEDICINE

## 2019-11-06 PROCEDURE — 90686 FLU VACCINE (QUAD) GREATER THAN OR EQUAL TO 3YO PRESERVATIVE FREE IM: ICD-10-PCS | Mod: HCNC,S$GLB,, | Performed by: FAMILY MEDICINE

## 2019-11-06 PROCEDURE — 99214 OFFICE O/P EST MOD 30 MIN: CPT | Mod: 25,HCNC,S$GLB, | Performed by: FAMILY MEDICINE

## 2019-11-06 PROCEDURE — 3008F PR BODY MASS INDEX (BMI) DOCUMENTED: ICD-10-PCS | Mod: HCNC,CPTII,S$GLB, | Performed by: FAMILY MEDICINE

## 2019-11-06 RX ORDER — METOPROLOL SUCCINATE 25 MG/1
25 TABLET, EXTENDED RELEASE ORAL DAILY
Qty: 90 TABLET | Refills: 0 | Status: SHIPPED | OUTPATIENT
Start: 2019-11-06 | End: 2020-02-03 | Stop reason: SDUPTHER

## 2019-11-07 ENCOUNTER — LAB VISIT (OUTPATIENT)
Dept: LAB | Facility: HOSPITAL | Age: 34
End: 2019-11-07
Attending: FAMILY MEDICINE
Payer: MEDICARE

## 2019-11-07 DIAGNOSIS — I10 HYPERTENSION, BENIGN: ICD-10-CM

## 2019-11-07 DIAGNOSIS — E11.9 DIABETES MELLITUS WITHOUT COMPLICATION: ICD-10-CM

## 2019-11-07 DIAGNOSIS — L65.9 HAIR LOSS: ICD-10-CM

## 2019-11-07 LAB
ALBUMIN SERPL BCP-MCNC: 3.9 G/DL (ref 3.5–5.2)
ALP SERPL-CCNC: 108 U/L (ref 55–135)
ALT SERPL W/O P-5'-P-CCNC: 14 U/L (ref 10–44)
ANION GAP SERPL CALC-SCNC: 11 MMOL/L (ref 8–16)
AST SERPL-CCNC: 14 U/L (ref 10–40)
BILIRUB SERPL-MCNC: 0.4 MG/DL (ref 0.1–1)
BUN SERPL-MCNC: 11 MG/DL (ref 6–20)
CALCIUM SERPL-MCNC: 10 MG/DL (ref 8.7–10.5)
CHLORIDE SERPL-SCNC: 101 MMOL/L (ref 95–110)
CHOLEST SERPL-MCNC: 197 MG/DL (ref 120–199)
CHOLEST/HDLC SERPL: 5.5 {RATIO} (ref 2–5)
CO2 SERPL-SCNC: 27 MMOL/L (ref 23–29)
CREAT SERPL-MCNC: 1 MG/DL (ref 0.5–1.4)
EST. GFR  (AFRICAN AMERICAN): >60 ML/MIN/1.73 M^2
EST. GFR  (NON AFRICAN AMERICAN): >60 ML/MIN/1.73 M^2
ESTIMATED AVG GLUCOSE: 128 MG/DL (ref 68–131)
GLUCOSE SERPL-MCNC: 117 MG/DL (ref 70–110)
HBA1C MFR BLD HPLC: 6.1 % (ref 4–5.6)
HDLC SERPL-MCNC: 36 MG/DL (ref 40–75)
HDLC SERPL: 18.3 % (ref 20–50)
LDLC SERPL CALC-MCNC: 92.4 MG/DL (ref 63–159)
NONHDLC SERPL-MCNC: 161 MG/DL
POTASSIUM SERPL-SCNC: 3.7 MMOL/L (ref 3.5–5.1)
PROT SERPL-MCNC: 7.7 G/DL (ref 6–8.4)
SODIUM SERPL-SCNC: 139 MMOL/L (ref 136–145)
T4 FREE SERPL-MCNC: 0.81 NG/DL (ref 0.71–1.51)
TRIGL SERPL-MCNC: 343 MG/DL (ref 30–150)
TSH SERPL DL<=0.005 MIU/L-ACNC: 4.02 UIU/ML (ref 0.4–4)

## 2019-11-07 PROCEDURE — 80053 COMPREHEN METABOLIC PANEL: CPT | Mod: HCNC

## 2019-11-07 PROCEDURE — 84439 ASSAY OF FREE THYROXINE: CPT | Mod: HCNC

## 2019-11-07 PROCEDURE — 36415 COLL VENOUS BLD VENIPUNCTURE: CPT | Mod: HCNC,PN

## 2019-11-07 PROCEDURE — 80061 LIPID PANEL: CPT | Mod: HCNC

## 2019-11-07 PROCEDURE — 84443 ASSAY THYROID STIM HORMONE: CPT | Mod: HCNC

## 2019-11-07 PROCEDURE — 83036 HEMOGLOBIN GLYCOSYLATED A1C: CPT | Mod: HCNC

## 2019-11-18 NOTE — PROGRESS NOTES
Routine Office Visit    Patient Name: Shadia Johnson    : 1985  MRN: [id number]    Subjective:  Shadia is a 34 y.o. female who presents today for:   Chief Complaint   Patient presents with    Breast Pain     fall 4 mos ago visible knot and hurts when touched(6)     34-year-old female comes with multiple concerns.  First she reports that she was to be tested for diabetes.  However I remind her that she was ready tested for this prior to her bariatric surgery and she was diabetic.  She had not followed up since her surgery.  She does not know exactly how much she has lost since her surgery.  She does not check her sugars at home.  She is not on any diabetic medications.  The patient is also reporting that for the last several weeks she has been having right breast pain.  It is not associated with her periods.  There has been no discharge from the best of the nipple.  There has been no bleeding from the breast with nipple.  She does report a trauma it while ago to the breast.  The the trauma is in the location of the pain.  The patient also has history of hypertension, and PSVT.  She stopped taking the previously prescribed metoprolol.  The she states that she did not want to keep following up with the cardiologist that had prescribed.  However, she is willing to restart the medication.     Past Medical History  Past Medical History:   Diagnosis Date    Anemia     Anxiety     Asthma     Childhood    Depression     Endometriosis of pelvis     Endometriosis, severe     Stage 4    History of endometriosis     Interstitial cystitis     Ulcer     aiden       Past Surgical History  Past Surgical History:   Procedure Laterality Date    COLONOSCOPY      HYSTERECTOMY      endometriosis    interSTIM IMPLANT      LAPOROSCOPY  2012    left salpingectomy      MAMMOGRAM  2010    TRACHELECTOMY  12        Family History  Family History   Problem Relation Age of Onset    Alcohol abuse Mother      Cancer Maternal Grandmother         skin    Depression Maternal Grandmother     Heart disease Paternal Uncle     Hyperlipidemia Paternal Uncle        Social History  Social History     Socioeconomic History    Marital status:      Spouse name: Not on file    Number of children: Not on file    Years of education: Not on file    Highest education level: Not on file   Occupational History    Not on file   Social Needs    Financial resource strain: Not on file    Food insecurity:     Worry: Not on file     Inability: Not on file    Transportation needs:     Medical: Not on file     Non-medical: Not on file   Tobacco Use    Smoking status: Never Smoker    Smokeless tobacco: Never Used    Tobacco comment: , .  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  Disabled, SSDI.  Kids: 9 & 2.   Substance and Sexual Activity    Alcohol use: No    Drug use: No    Sexual activity: Yes     Partners: Male     Birth control/protection: Surgical     Comment: : Todd  ( at hot).    Lifestyle    Physical activity:     Days per week: Not on file     Minutes per session: Not on file    Stress: Not on file   Relationships    Social connections:     Talks on phone: Not on file     Gets together: Not on file     Attends Rastafari service: Not on file     Active member of club or organization: Not on file     Attends meetings of clubs or organizations: Not on file     Relationship status: Not on file   Other Topics Concern    Not on file   Social History Narrative    .  Occup:  Disabled.         Current Medications  Current Outpatient Medications on File Prior to Visit   Medication Sig Dispense Refill    albuterol (VENTOLIN HFA) 90 mcg/actuation inhaler INHALE 2 puffs into the lungs EVERY 6 HOURS AS NEEDED 36 g 5    estradiol valerate (DELESTROGEN) 20 mg/mL injection INJECT 1 ml EVERY 30 days 5 mL 4    estradiol valerate (DELESTROGEN) 20 mg/mL injection INJECT 1 ml EVERY 30 days 5 mL 2  "   ESTRING 2 mg (7.5 mcg /24 hour) vaginal ring Place 2 mg vaginally every 3 (three) months. 1 each 0    montelukast (SINGULAIR) 10 mg tablet       omeprazole (PRILOSEC) 20 MG capsule TAKE ONE Capsule BY MOUTH once DAILY 30 capsule 2    oxybutynin (DITROPAN-XL) 10 MG 24 hr tablet       topiramate (TOPAMAX) 50 MG tablet Take 1 tablet (50 mg total) by mouth 2 (two) times daily. 60 tablet 0     No current facility-administered medications on file prior to visit.        Allergies   Review of patient's allergies indicates:  No Known Allergies    Review of Systems   Constitutional: Negative for chills and fever.   Respiratory: Negative for shortness of breath and wheezing.    Cardiovascular: Negative for chest pain and palpitations.   Gastrointestinal: Negative for abdominal pain, blood in stool, constipation, diarrhea and nausea.   Genitourinary: Negative for dysuria.   Musculoskeletal: Negative for back pain.   Skin: Negative for rash.         BP (!) 146/84 (BP Location: Left arm, Patient Position: Sitting, BP Method: Medium (Automatic))   Pulse 80   Temp 98 °F (36.7 °C) (Oral)   Resp 20   Ht 5' 5" (1.651 m)   Wt 108.3 kg (238 lb 12.1 oz)   SpO2 100%   BMI 39.73 kg/m²     Physical Exam   Constitutional: She is oriented to person, place, and time. She appears well-developed and well-nourished.   HENT:   Head: Normocephalic and atraumatic.   Right Ear: External ear normal.   Left Ear: External ear normal.   Nose: Nose normal.   Mouth/Throat: Oropharynx is clear and moist. No oropharyngeal exudate.   Eyes: Pupils are equal, round, and reactive to light. Conjunctivae and EOM are normal. Right eye exhibits no discharge. Left eye exhibits no discharge.   Neck: Normal range of motion. Neck supple. No tracheal deviation present.   Cardiovascular: Normal rate, regular rhythm, normal heart sounds and intact distal pulses.   No murmur heard.  Pulmonary/Chest: Effort normal and breath sounds normal. She has no wheezes. " She has no rales.   Abdominal: Soft. Bowel sounds are normal. She exhibits no mass. There is no tenderness. There is no rigidity, no guarding and no CVA tenderness.   Lymphadenopathy:     She has no cervical adenopathy.   Neurological: She is oriented to person, place, and time. She has normal strength. She displays no atrophy. No sensory deficit. Gait normal.   Reflex Scores:       Patellar reflexes are 2+ on the right side and 2+ on the left side.  Psychiatric: She has a normal mood and affect.   Vitals reviewed.    Protective Sensation (w/ 10 gram monofilament):  Right: Intact  Left: Intact    Visual Inspection:  Normal -  Bilateral    Pedal Pulses:   Right: Present  Left: Present    Posterior tibialis:   Right:Present  Left: Present        Assessment/Plan:  Shadia was seen today for breast pain.    Diagnoses and all orders for this visit:    Diabetes mellitus without complication  -     Comprehensive metabolic panel; Future  -     Hemoglobin A1c; Future  -     Lipid panel; Future  -     Microalbumin/creatinine urine ratio; Future  -     Ambulatory referral to Optometry  Check diabetic labs.  Patient referred to Optometry for eye exam.    PSVT (paroxysmal supraventricular tachycardia)  -     metoprolol succinate (TOPROL-XL) 25 MG 24 hr tablet; Take 1 tablet (25 mg total) by mouth once daily.  Restart Toprol XL    Breast pain, right  -     Mammo Digital Diagnostic Right With CAD; Future  -     US Breast Right Complete; Future  Will get mammogram and ultrasound of breast for further evaluation.    Hypertension, benign  -     metoprolol succinate (TOPROL-XL) 25 MG 24 hr tablet; Take 1 tablet (25 mg total) by mouth once daily.  -     Comprehensive metabolic panel; Future  -     Microalbumin/creatinine urine ratio; Future  Dangers of uncontrolled blood pressure discussed.  Restart Toprol XL.  Recheck blood pressure in the next 2 to 4 weeks.    Need for vaccination  -     Influenza - Quadrivalent (6 months+)  (PF)    Hair loss  -     TSH; Future  Check TSH    Screening for diabetic retinopathy  -     Ambulatory referral to Optometry                -Gerardo Malcolm Jr., MD, AAHIVS          This office note has been dictated.  This dictation has been generated using M-Modal Fluency Direct dictation; some phonetic errors may occur.

## 2019-11-26 ENCOUNTER — HOSPITAL ENCOUNTER (OUTPATIENT)
Dept: RADIOLOGY | Facility: HOSPITAL | Age: 34
Discharge: HOME | End: 2019-11-26
Attending: FAMILY MEDICINE
Payer: MEDICARE

## 2019-11-26 DIAGNOSIS — N64.4 BREAST PAIN, RIGHT: ICD-10-CM

## 2019-11-26 PROCEDURE — 76642 US BREAST RIGHT LIMITED: ICD-10-PCS | Mod: 26,HCNC,RT, | Performed by: RADIOLOGY

## 2019-11-26 PROCEDURE — 76642 ULTRASOUND BREAST LIMITED: CPT | Mod: TC,HCNC,RT

## 2019-11-26 PROCEDURE — 76642 ULTRASOUND BREAST LIMITED: CPT | Mod: 26,HCNC,RT, | Performed by: RADIOLOGY

## 2019-11-29 ENCOUNTER — OFFICE VISIT (OUTPATIENT)
Dept: FAMILY MEDICINE | Facility: CLINIC | Age: 34
End: 2019-11-29
Payer: MEDICARE

## 2019-11-29 DIAGNOSIS — I47.10 PSVT (PAROXYSMAL SUPRAVENTRICULAR TACHYCARDIA): ICD-10-CM

## 2019-11-29 DIAGNOSIS — G47.00 INSOMNIA, UNSPECIFIED TYPE: ICD-10-CM

## 2019-11-29 DIAGNOSIS — E11.9 DIABETES MELLITUS WITHOUT COMPLICATION: Primary | ICD-10-CM

## 2019-11-29 DIAGNOSIS — E03.8 SUBCLINICAL HYPOTHYROIDISM: ICD-10-CM

## 2019-11-29 DIAGNOSIS — L65.9 HAIR LOSS: ICD-10-CM

## 2019-11-29 DIAGNOSIS — E78.2 MIXED DYSLIPIDEMIA: ICD-10-CM

## 2019-11-29 DIAGNOSIS — I10 HYPERTENSION, BENIGN: ICD-10-CM

## 2019-11-29 PROCEDURE — 3008F BODY MASS INDEX DOCD: CPT | Mod: HCNC,CPTII,S$GLB, | Performed by: FAMILY MEDICINE

## 2019-11-29 PROCEDURE — 99214 PR OFFICE/OUTPT VISIT, EST, LEVL IV, 30-39 MIN: ICD-10-PCS | Mod: HCNC,S$GLB,, | Performed by: FAMILY MEDICINE

## 2019-11-29 PROCEDURE — 99999 PR PBB SHADOW E&M-EST. PATIENT-LVL IV: CPT | Mod: PBBFAC,HCNC,, | Performed by: FAMILY MEDICINE

## 2019-11-29 PROCEDURE — 3075F PR MOST RECENT SYSTOLIC BLOOD PRESS GE 130-139MM HG: ICD-10-PCS | Mod: HCNC,CPTII,S$GLB, | Performed by: FAMILY MEDICINE

## 2019-11-29 PROCEDURE — 3078F DIAST BP <80 MM HG: CPT | Mod: HCNC,CPTII,S$GLB, | Performed by: FAMILY MEDICINE

## 2019-11-29 PROCEDURE — 3008F PR BODY MASS INDEX (BMI) DOCUMENTED: ICD-10-PCS | Mod: HCNC,CPTII,S$GLB, | Performed by: FAMILY MEDICINE

## 2019-11-29 PROCEDURE — 3078F PR MOST RECENT DIASTOLIC BLOOD PRESSURE < 80 MM HG: ICD-10-PCS | Mod: HCNC,CPTII,S$GLB, | Performed by: FAMILY MEDICINE

## 2019-11-29 PROCEDURE — 3044F PR MOST RECENT HEMOGLOBIN A1C LEVEL <7.0%: ICD-10-PCS | Mod: HCNC,CPTII,S$GLB, | Performed by: FAMILY MEDICINE

## 2019-11-29 PROCEDURE — 99999 PR PBB SHADOW E&M-EST. PATIENT-LVL IV: ICD-10-PCS | Mod: PBBFAC,HCNC,, | Performed by: FAMILY MEDICINE

## 2019-11-29 PROCEDURE — 3044F HG A1C LEVEL LT 7.0%: CPT | Mod: HCNC,CPTII,S$GLB, | Performed by: FAMILY MEDICINE

## 2019-11-29 PROCEDURE — 99214 OFFICE O/P EST MOD 30 MIN: CPT | Mod: HCNC,S$GLB,, | Performed by: FAMILY MEDICINE

## 2019-11-29 PROCEDURE — 3075F SYST BP GE 130 - 139MM HG: CPT | Mod: HCNC,CPTII,S$GLB, | Performed by: FAMILY MEDICINE

## 2019-11-29 RX ORDER — HYDROXYZINE HYDROCHLORIDE 25 MG/1
25 TABLET, FILM COATED ORAL NIGHTLY PRN
Qty: 90 TABLET | Refills: 1 | Status: SHIPPED | OUTPATIENT
Start: 2019-11-29 | End: 2020-02-03 | Stop reason: SDUPTHER

## 2019-12-08 VITALS
OXYGEN SATURATION: 99 % | HEART RATE: 78 BPM | WEIGHT: 237.88 LBS | BODY MASS INDEX: 39.63 KG/M2 | RESPIRATION RATE: 19 BRPM | DIASTOLIC BLOOD PRESSURE: 78 MMHG | HEIGHT: 65 IN | TEMPERATURE: 98 F | SYSTOLIC BLOOD PRESSURE: 136 MMHG

## 2019-12-09 NOTE — PROGRESS NOTES
Routine Office Visit    Patient Name: Shadia Johnson    : 1985  MRN: [id number]    Subjective:  Shadia is a 34 y.o. female who presents today for:   Chief Complaint   Patient presents with    Results       34-year-old female with diabetes, hypertension, and PSVT, comes in for routine follow-up on these.  The patient had a bariatric surgery earlier this year and has lost some weight although she does not know exactly how much.  She was concerned about her sugars recently which is why lab tests were done. She is not on medications for her sugars. She does not measure her sugars.  The patient is a reporting that for quite some time she suffers from insomnia and takes Tylenol p.m. for it.  She would like to know what other options she has.      Past Medical History  Past Medical History:   Diagnosis Date    Anemia     Anxiety     Asthma     Childhood    Depression     Endometriosis of pelvis     Endometriosis, severe     Stage 4    History of endometriosis     Interstitial cystitis     Ulcer     hunners       Past Surgical History  Past Surgical History:   Procedure Laterality Date    COLONOSCOPY      HYSTERECTOMY      endometriosis    interSTIM IMPLANT      LAPOROSCOPY      left salpingectomy      MAMMOGRAM  2010    TRACHELECTOMY  12        Family History  Family History   Problem Relation Age of Onset    Alcohol abuse Mother     Cancer Maternal Grandmother         skin    Depression Maternal Grandmother     Heart disease Paternal Uncle     Hyperlipidemia Paternal Uncle        Social History  Social History     Socioeconomic History    Marital status:      Spouse name: Not on file    Number of children: Not on file    Years of education: Not on file    Highest education level: Not on file   Occupational History    Not on file   Social Needs    Financial resource strain: Not on file    Food insecurity:     Worry: Not on file     Inability: Not on file     Transportation needs:     Medical: Not on file     Non-medical: Not on file   Tobacco Use    Smoking status: Never Smoker    Smokeless tobacco: Never Used    Tobacco comment: , .  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  Disabled, SSDI.  Kids: 9 & 2.   Substance and Sexual Activity    Alcohol use: No    Drug use: No    Sexual activity: Yes     Partners: Male     Birth control/protection: Surgical     Comment: : Todd  ( at hotel).    Lifestyle    Physical activity:     Days per week: Not on file     Minutes per session: Not on file    Stress: Not on file   Relationships    Social connections:     Talks on phone: Not on file     Gets together: Not on file     Attends Restoration service: Not on file     Active member of club or organization: Not on file     Attends meetings of clubs or organizations: Not on file     Relationship status: Not on file   Other Topics Concern    Not on file   Social History Narrative    .  Occup:  Disabled.         Current Medications  Current Outpatient Medications on File Prior to Visit   Medication Sig Dispense Refill    albuterol (VENTOLIN HFA) 90 mcg/actuation inhaler INHALE 2 puffs into the lungs EVERY 6 HOURS AS NEEDED 36 g 5    estradiol valerate (DELESTROGEN) 20 mg/mL injection INJECT 1 ml EVERY 30 days 5 mL 4    estradiol valerate (DELESTROGEN) 20 mg/mL injection INJECT 1 ml EVERY 30 days 5 mL 2    ESTRING 2 mg (7.5 mcg /24 hour) vaginal ring Place 2 mg vaginally every 3 (three) months. 1 each 0    metoprolol succinate (TOPROL-XL) 25 MG 24 hr tablet Take 1 tablet (25 mg total) by mouth once daily. 90 tablet 0    montelukast (SINGULAIR) 10 mg tablet       omeprazole (PRILOSEC) 20 MG capsule TAKE ONE Capsule BY MOUTH once DAILY 30 capsule 2    oxybutynin (DITROPAN-XL) 10 MG 24 hr tablet       topiramate (TOPAMAX) 50 MG tablet Take 1 tablet (50 mg total) by mouth 2 (two) times daily. 60 tablet 0     No current facility-administered  "medications on file prior to visit.        Allergies   Review of patient's allergies indicates:  No Known Allergies    Review of Systems   Constitutional: Negative for chills and fever.   Respiratory: Negative for shortness of breath and wheezing.    Cardiovascular: Negative for chest pain and palpitations.   Gastrointestinal: Negative for abdominal pain, blood in stool, constipation, diarrhea and nausea.   Genitourinary: Negative for dysuria.   Musculoskeletal: Negative for back pain.   Skin: Negative for rash.       /78 (BP Location: Left arm, Patient Position: Sitting, BP Method: Medium (Automatic))   Pulse 78   Temp 98.2 °F (36.8 °C) (Oral)   Resp 19   Ht 5' 5" (1.651 m)   Wt 107.9 kg (237 lb 14 oz)   SpO2 99%   BMI 39.58 kg/m²     Physical Exam   Constitutional: She is oriented to person, place, and time. She appears well-developed and well-nourished.   HENT:   Head: Normocephalic and atraumatic.   Right Ear: External ear normal.   Left Ear: External ear normal.   Nose: Nose normal.   Mouth/Throat: Oropharynx is clear and moist. No oropharyngeal exudate.   Eyes: Pupils are equal, round, and reactive to light. Conjunctivae and EOM are normal. Right eye exhibits no discharge. Left eye exhibits no discharge.   Neck: Normal range of motion. Neck supple. No tracheal deviation present.   Cardiovascular: Normal rate, regular rhythm, normal heart sounds and intact distal pulses.   No murmur heard.  Pulmonary/Chest: Effort normal and breath sounds normal. She has no wheezes. She has no rales.   Abdominal: Soft. Bowel sounds are normal. She exhibits no mass. There is no tenderness. There is no rigidity, no guarding and no CVA tenderness.   Lymphadenopathy:     She has no cervical adenopathy.   Neurological: She is oriented to person, place, and time. She has normal strength. She displays no atrophy. No sensory deficit. Gait normal.   Reflex Scores:       Patellar reflexes are 2+ on the right side and 2+ on " the left side.  Psychiatric: She has a normal mood and affect.   Vitals reviewed.      Lab Visit on 11/07/2019   Component Date Value Ref Range Status    Microalbum.,U,Random 11/07/2019 31.0  ug/mL Final    Creatinine, Random Ur 11/07/2019 303.0  15.0 - 325.0 mg/dL Final    Comment: The random urine reference ranges provided were established   for 24 hour urine collections.  No reference ranges exist for  random urine specimens.  Correlate clinically.      Microalb Creat Ratio 11/07/2019 10.2  0.0 - 30.0 ug/mg Final   Lab Visit on 11/07/2019   Component Date Value Ref Range Status    Sodium 11/07/2019 139  136 - 145 mmol/L Final    Potassium 11/07/2019 3.7  3.5 - 5.1 mmol/L Final    Chloride 11/07/2019 101  95 - 110 mmol/L Final    CO2 11/07/2019 27  23 - 29 mmol/L Final    Glucose 11/07/2019 117* 70 - 110 mg/dL Final    BUN, Bld 11/07/2019 11  6 - 20 mg/dL Final    Creatinine 11/07/2019 1.0  0.5 - 1.4 mg/dL Final    Calcium 11/07/2019 10.0  8.7 - 10.5 mg/dL Final    Total Protein 11/07/2019 7.7  6.0 - 8.4 g/dL Final    Albumin 11/07/2019 3.9  3.5 - 5.2 g/dL Final    Total Bilirubin 11/07/2019 0.4  0.1 - 1.0 mg/dL Final    Comment: For infants and newborns, interpretation of results should be based  on gestational age, weight and in agreement with clinical  observations.  Premature Infant recommended reference ranges:  Up to 24 hours.............<8.0 mg/dL  Up to 48 hours............<12.0 mg/dL  3-5 days..................<15.0 mg/dL  6-29 days.................<15.0 mg/dL      Alkaline Phosphatase 11/07/2019 108  55 - 135 U/L Final    AST 11/07/2019 14  10 - 40 U/L Final    ALT 11/07/2019 14  10 - 44 U/L Final    Anion Gap 11/07/2019 11  8 - 16 mmol/L Final    eGFR if African American 11/07/2019 >60  >60 mL/min/1.73 m^2 Final    eGFR if non African American 11/07/2019 >60  >60 mL/min/1.73 m^2 Final    Comment: Calculation used to obtain the estimated glomerular filtration  rate (eGFR) is the  CKD-EPI equation.       Hemoglobin A1C 11/07/2019 6.1* 4.0 - 5.6 % Final    Comment: ADA Screening Guidelines:  5.7-6.4%  Consistent with prediabetes  >or=6.5%  Consistent with diabetes  High levels of fetal hemoglobin interfere with the HbA1C  assay. Heterozygous hemoglobin variants (HbS, HgC, etc)do  not significantly interfere with this assay.   However, presence of multiple variants may affect accuracy.      Estimated Avg Glucose 11/07/2019 128  68 - 131 mg/dL Final    Cholesterol 11/07/2019 197  120 - 199 mg/dL Final    Comment: The National Cholesterol Education Program (NCEP) has set the  following guidelines (reference ranges) for Cholesterol:  Optimal.....................<200 mg/dL  Borderline High.............200-239 mg/dL  High........................> or = 240 mg/dL      Triglycerides 11/07/2019 343* 30 - 150 mg/dL Final    Comment: The National Cholesterol Education Program (NCEP) has set the  following guidelines (reference values) for triglycerides:  Normal......................<150 mg/dL  Borderline High.............150-199 mg/dL  High........................200-499 mg/dL      HDL 11/07/2019 36* 40 - 75 mg/dL Final    Comment: The National Cholesterol Education Program (NCEP) has set the  following guidelines (reference values) for HDL Cholesterol:  Low...............<40 mg/dL  Optimal...........>60 mg/dL      LDL Cholesterol 11/07/2019 92.4  63.0 - 159.0 mg/dL Final    Comment: The National Cholesterol Education Program (NCEP) has set the  following guidelines (reference values) for LDL Cholesterol:  Optimal.......................<130 mg/dL  Borderline High...............130-159 mg/dL  High..........................160-189 mg/dL  Very High.....................>190 mg/dL      Hdl/Cholesterol Ratio 11/07/2019 18.3* 20.0 - 50.0 % Final    Total Cholesterol/HDL Ratio 11/07/2019 5.5* 2.0 - 5.0 Final    Non-HDL Cholesterol 11/07/2019 161  mg/dL Final    Comment: Risk category and Non-HDL  cholesterol goals:  Coronary heart disease (CHD)or equivalent (10-year risk of CHD >20%):  Non-HDL cholesterol goal     <130 mg/dL  Two or more CHD risk factors and 10-year risk of CHD <= 20%:  Non-HDL cholesterol goal     <160 mg/dL  0 to 1 CHD risk factor:  Non-HDL cholesterol goal     <190 mg/dL      TSH 11/07/2019 4.023* 0.400 - 4.000 uIU/mL Final    Free T4 11/07/2019 0.81  0.71 - 1.51 ng/dL Final       Assessment/Plan:  Shadia was seen today for results.    Diagnoses and all orders for this visit:    Diabetes mellitus without complication  -     Comprehensive metabolic panel; Future  Diabetes appears to be well controlled with her weight loss and diet.  Continue dietary control.    PSVT (paroxysmal supraventricular tachycardia)  Continue beta blocker    Hypertension, benign  Continue Toprol Xl    Hair loss  -     Ambulatory referral to Dermatology  Patient concerned about hair loss and she was referred to Dermatology.    Mixed dyslipidemia  -     Comprehensive metabolic panel; Future  -     Lipid panel; Future  Will recheck lipids in four weeks.    Subclinical hypothyroidism  -     TSH; Future  Check TSH in 4 weeks.     Insomnia, unspecified type  -     hydrOXYzine HCl (ATARAX) 25 MG tablet; Take 1 tablet (25 mg total) by mouth nightly as needed (Insomnia).  Trial of hydroxyzine.                -Gerardo Malcolm Jr., MD, AAHIVS          This office note has been dictated.  This dictation has been generated using M-Modal Fluency Direct dictation; some phonetic errors may occur.

## 2020-01-09 NOTE — TELEPHONE ENCOUNTER
----- Message from Liliane Henley sent at 1/9/2020  1:15 PM CST -----  Contact: Patient ph [phone]  Type: RX Refill Request    Who Called: Patient    Have you contacted your pharmacy: Yes.    Refill or New Rx: Refill    RX Name and Strength: Estrace Patches, per pt    Is this a 30 day or 90 day RX: 90 day    Preferred Pharmacy with phone number: .  [id number] - [place] - [address] AT [facility] [address]  [address]  [place] [address]  Phone: [phone] Fax: [phone]    Local or Mail Order: Local    Would the patient rather a call back or a response via [facility]? Call back    Best Call Back Number: [phone]

## 2020-01-09 NOTE — TELEPHONE ENCOUNTER
I spoke to the pt and advised she would need to contact her GYN for that refill as Dr. Malcolm did not prescribe the patches. Pt verbalizes understanding.

## 2020-01-10 DIAGNOSIS — E11.9 TYPE 2 DIABETES MELLITUS WITHOUT COMPLICATION, UNSPECIFIED WHETHER LONG TERM INSULIN USE: ICD-10-CM

## 2020-01-20 ENCOUNTER — PATIENT OUTREACH (OUTPATIENT)
Dept: ADMINISTRATIVE | Facility: HOSPITAL | Age: 35
End: 2020-01-20

## 2020-02-03 ENCOUNTER — OFFICE VISIT (OUTPATIENT)
Dept: FAMILY MEDICINE | Facility: CLINIC | Age: 35
End: 2020-02-03
Payer: MEDICARE

## 2020-02-03 VITALS
DIASTOLIC BLOOD PRESSURE: 68 MMHG | OXYGEN SATURATION: 96 % | TEMPERATURE: 99 F | WEIGHT: 222.69 LBS | SYSTOLIC BLOOD PRESSURE: 132 MMHG | RESPIRATION RATE: 16 BRPM | HEART RATE: 99 BPM | BODY MASS INDEX: 37.05 KG/M2

## 2020-02-03 DIAGNOSIS — E11.9 DIABETES MELLITUS WITHOUT COMPLICATION: ICD-10-CM

## 2020-02-03 DIAGNOSIS — E66.01 MORBID OBESITY: ICD-10-CM

## 2020-02-03 DIAGNOSIS — I10 HYPERTENSION, BENIGN: ICD-10-CM

## 2020-02-03 DIAGNOSIS — G47.00 INSOMNIA, UNSPECIFIED TYPE: ICD-10-CM

## 2020-02-03 DIAGNOSIS — I47.10 PSVT (PAROXYSMAL SUPRAVENTRICULAR TACHYCARDIA): ICD-10-CM

## 2020-02-03 PROCEDURE — 99999 PR PBB SHADOW E&M-EST. PATIENT-LVL III: CPT | Mod: PBBFAC,HCNC,, | Performed by: FAMILY MEDICINE

## 2020-02-03 PROCEDURE — 99499 RISK ADDL DX/OHS AUDIT: ICD-10-PCS | Mod: S$GLB,,, | Performed by: FAMILY MEDICINE

## 2020-02-03 PROCEDURE — 99214 PR OFFICE/OUTPT VISIT, EST, LEVL IV, 30-39 MIN: ICD-10-PCS | Mod: HCNC,S$GLB,, | Performed by: FAMILY MEDICINE

## 2020-02-03 PROCEDURE — 3044F HG A1C LEVEL LT 7.0%: CPT | Mod: HCNC,CPTII,S$GLB, | Performed by: FAMILY MEDICINE

## 2020-02-03 PROCEDURE — 3008F PR BODY MASS INDEX (BMI) DOCUMENTED: ICD-10-PCS | Mod: HCNC,CPTII,S$GLB, | Performed by: FAMILY MEDICINE

## 2020-02-03 PROCEDURE — 3075F PR MOST RECENT SYSTOLIC BLOOD PRESS GE 130-139MM HG: ICD-10-PCS | Mod: HCNC,CPTII,S$GLB, | Performed by: FAMILY MEDICINE

## 2020-02-03 PROCEDURE — 99499 UNLISTED E&M SERVICE: CPT | Mod: S$GLB,,, | Performed by: FAMILY MEDICINE

## 2020-02-03 PROCEDURE — 3078F DIAST BP <80 MM HG: CPT | Mod: HCNC,CPTII,S$GLB, | Performed by: FAMILY MEDICINE

## 2020-02-03 PROCEDURE — 3078F PR MOST RECENT DIASTOLIC BLOOD PRESSURE < 80 MM HG: ICD-10-PCS | Mod: HCNC,CPTII,S$GLB, | Performed by: FAMILY MEDICINE

## 2020-02-03 PROCEDURE — 3075F SYST BP GE 130 - 139MM HG: CPT | Mod: HCNC,CPTII,S$GLB, | Performed by: FAMILY MEDICINE

## 2020-02-03 PROCEDURE — 99999 PR PBB SHADOW E&M-EST. PATIENT-LVL III: ICD-10-PCS | Mod: PBBFAC,HCNC,, | Performed by: FAMILY MEDICINE

## 2020-02-03 PROCEDURE — 3008F BODY MASS INDEX DOCD: CPT | Mod: HCNC,CPTII,S$GLB, | Performed by: FAMILY MEDICINE

## 2020-02-03 PROCEDURE — 3044F PR MOST RECENT HEMOGLOBIN A1C LEVEL <7.0%: ICD-10-PCS | Mod: HCNC,CPTII,S$GLB, | Performed by: FAMILY MEDICINE

## 2020-02-03 PROCEDURE — 99214 OFFICE O/P EST MOD 30 MIN: CPT | Mod: HCNC,S$GLB,, | Performed by: FAMILY MEDICINE

## 2020-02-03 RX ORDER — METOPROLOL SUCCINATE 25 MG/1
25 TABLET, EXTENDED RELEASE ORAL DAILY
Qty: 90 TABLET | Refills: 3 | Status: SHIPPED | OUTPATIENT
Start: 2020-02-03 | End: 2020-02-10

## 2020-02-03 RX ORDER — SPIRONOLACTONE AND HYDROCHLOROTHIAZIDE 25; 25 MG/1; MG/1
1 TABLET ORAL DAILY
COMMUNITY
End: 2022-03-29

## 2020-02-03 RX ORDER — HYDROXYZINE HYDROCHLORIDE 50 MG/1
25 TABLET, FILM COATED ORAL NIGHTLY PRN
Qty: 90 TABLET | Refills: 3 | Status: SHIPPED | OUTPATIENT
Start: 2020-02-03 | End: 2022-03-29

## 2020-02-03 NOTE — PROGRESS NOTES
Subjective:       Patient ID: Shadia Johnson is a 34 y.o. female.    Chief Complaint: Follow-up    HPI.  34 year old female, who underwent bariatric surgery last year, and reports a 60 lb weight loss comes in for routine follow up on diet controlled diabetes, hypertension, paroxysmal supraventricular tachycardia, and insomnia. She does not check her sugars or blood pressure at home. She reports that she is happy with her weight loss. She states that the hydroxyzine 25 mg is not helping with insomnia. Previously had been on Trazodone 100 mg and this neither helped and it also caused too much morning after grogginess.     Review of Systems   Constitutional: Negative for chills and fever.   Respiratory: Negative for shortness of breath and wheezing.    Cardiovascular: Negative for chest pain and palpitations.   Gastrointestinal: Negative for abdominal pain, blood in stool, constipation, diarrhea and nausea.   Genitourinary: Negative for dysuria.   Musculoskeletal: Negative for back pain.   Skin: Negative for rash.   Psychiatric/Behavioral: Positive for sleep disturbance. Negative for confusion, dysphoric mood, hallucinations and suicidal ideas. The patient is not nervous/anxious and is not hyperactive.        Objective:     /68 (BP Location: Left arm, Patient Position: Sitting, BP Method: Medium (Manual))   Pulse 99   Temp 98.7 °F (37.1 °C) (Oral)   Resp 16   Wt 101 kg (222 lb 10.6 oz)   SpO2 96%   BMI 37.05 kg/m²     Physical Exam   Constitutional: She is oriented to person, place, and time. She appears well-developed and well-nourished.   HENT:   Head: Normocephalic and atraumatic.   Right Ear: External ear normal.   Left Ear: External ear normal.   Nose: Nose normal.   Mouth/Throat: Oropharynx is clear and moist. No oropharyngeal exudate.   Eyes: Pupils are equal, round, and reactive to light. Conjunctivae and EOM are normal. Right eye exhibits no discharge. Left eye exhibits no discharge.   Neck:  Normal range of motion. Neck supple. No tracheal deviation present.   Cardiovascular: Normal rate, regular rhythm, normal heart sounds and intact distal pulses.   No murmur heard.  Pulmonary/Chest: Effort normal and breath sounds normal. She has no wheezes. She has no rales.   Abdominal: Soft. Bowel sounds are normal. She exhibits no mass. There is no tenderness. There is no rigidity, no guarding and no CVA tenderness.   Lymphadenopathy:     She has no cervical adenopathy.   Neurological: She is oriented to person, place, and time. She has normal strength. She displays no atrophy. No sensory deficit. Gait normal.   Reflex Scores:       Patellar reflexes are 2+ on the right side and 2+ on the left side.  Psychiatric: She has a normal mood and affect.   Vitals reviewed.      Assessment:       1. Diabetes mellitus without complication    2. Morbid obesity    3. PSVT (paroxysmal supraventricular tachycardia)    4. Hypertension, benign    5. Insomnia, unspecified type        Plan:       Shadia was seen today for follow-up.    Diagnoses and all orders for this visit:    Diabetes mellitus without complication  -     Comprehensive metabolic panel; Future  -     Lipid panel; Future  -     TSH; Future  -     Microalbumin/creatinine urine ratio; Future  -     Hemoglobin A1c; Future  Continue diet control.  Check labs    Morbid obesity  -     Comprehensive metabolic panel; Future  -     Lipid panel; Future  -     TSH; Future  -     Microalbumin/creatinine urine ratio; Future  -     Hemoglobin A1c; Future  Weight continues improving    PSVT (paroxysmal supraventricular tachycardia)  -     TSH; Future  -     metoprolol succinate (TOPROL-XL) 25 MG 24 hr tablet; Take 1 tablet (25 mg total) by mouth once daily.  Controlled on Toprol    Hypertension, benign  -     TSH; Future  -     metoprolol succinate (TOPROL-XL) 25 MG 24 hr tablet; Take 1 tablet (25 mg total) by mouth once daily.  Fair control on Toprol, continue    Insomnia,  unspecified type  -     hydrOXYzine HCl (ATARAX) 50 MG tablet; Take 0.5 tablets (25 mg total) by mouth nightly as needed (Insomnia).  Increase Atarax to 50 mg PRN

## 2020-02-06 ENCOUNTER — PATIENT OUTREACH (OUTPATIENT)
Dept: ADMINISTRATIVE | Facility: OTHER | Age: 35
End: 2020-02-06

## 2020-02-06 NOTE — PROGRESS NOTES
Chart reviewed.   Requested updates from Care Everywhere.  Immunizations reconciled.   HM updated.      Eye appt 02/10/2020

## 2020-02-07 ENCOUNTER — TELEPHONE (OUTPATIENT)
Dept: OPTOMETRY | Facility: CLINIC | Age: 35
End: 2020-02-07

## 2020-02-07 NOTE — TELEPHONE ENCOUNTER
[facility] Ins Benefits    Member Name: HECTOR MARTINEZ  Member ID: [id number]  Social Security Number: [id number]  YOB: 1985  Address: [address] [place], [place] [address]  [place] [address]  Phone Number:   Gender: Female  Responsible Member: HECTOR MARTINEZ    Network: [id number]  Group: [employer] [place] ([id number])  Benefit Level: 766

## 2020-02-10 DIAGNOSIS — I47.10 PSVT (PAROXYSMAL SUPRAVENTRICULAR TACHYCARDIA): ICD-10-CM

## 2020-02-10 DIAGNOSIS — I10 HYPERTENSION, BENIGN: ICD-10-CM

## 2020-02-10 RX ORDER — METOPROLOL SUCCINATE 25 MG/1
TABLET, EXTENDED RELEASE ORAL
Qty: 90 TABLET | Refills: 3 | Status: SHIPPED | OUTPATIENT
Start: 2020-02-10 | End: 2021-01-10 | Stop reason: SDUPTHER

## 2020-02-17 ENCOUNTER — PATIENT MESSAGE (OUTPATIENT)
Dept: OPTOMETRY | Facility: CLINIC | Age: 35
End: 2020-02-17

## 2020-03-05 RX ORDER — ALBUTEROL SULFATE 90 UG/1
AEROSOL, METERED RESPIRATORY (INHALATION)
Qty: 36 G | Refills: 5 | Status: SHIPPED | OUTPATIENT
Start: 2020-03-05 | End: 2021-01-10 | Stop reason: SDUPTHER

## 2020-07-17 DIAGNOSIS — E11.9 TYPE 2 DIABETES MELLITUS WITHOUT COMPLICATION, UNSPECIFIED WHETHER LONG TERM INSULIN USE: ICD-10-CM

## 2020-08-14 DIAGNOSIS — Z11.59 NEED FOR HEPATITIS C SCREENING TEST: ICD-10-CM

## 2020-08-21 DIAGNOSIS — E11.9 TYPE 2 DIABETES MELLITUS WITHOUT COMPLICATION: ICD-10-CM

## 2020-10-05 ENCOUNTER — PATIENT MESSAGE (OUTPATIENT)
Dept: ADMINISTRATIVE | Facility: HOSPITAL | Age: 35
End: 2020-10-05

## 2020-12-11 ENCOUNTER — PES CALL (OUTPATIENT)
Dept: ADMINISTRATIVE | Facility: CLINIC | Age: 35
End: 2020-12-11

## 2020-12-15 ENCOUNTER — OFFICE VISIT (OUTPATIENT)
Dept: FAMILY MEDICINE | Facility: CLINIC | Age: 35
End: 2020-12-15
Payer: MEDICARE

## 2020-12-15 DIAGNOSIS — J01.90 ACUTE RHINOSINUSITIS: Primary | ICD-10-CM

## 2020-12-15 PROCEDURE — 99213 OFFICE O/P EST LOW 20 MIN: CPT | Mod: 95,,, | Performed by: FAMILY MEDICINE

## 2020-12-15 PROCEDURE — 99213 PR OFFICE/OUTPT VISIT, EST, LEVL III, 20-29 MIN: ICD-10-PCS | Mod: 95,,, | Performed by: FAMILY MEDICINE

## 2020-12-15 RX ORDER — DOXYCYCLINE 100 MG/1
100 CAPSULE ORAL 2 TIMES DAILY
Qty: 14 CAPSULE | Refills: 0 | Status: SHIPPED | OUTPATIENT
Start: 2020-12-15 | End: 2020-12-22

## 2020-12-15 RX ORDER — IPRATROPIUM BROMIDE 42 UG/1
2 SPRAY, METERED NASAL 4 TIMES DAILY
Qty: 15 ML | Refills: 0 | Status: SHIPPED | OUTPATIENT
Start: 2020-12-15 | End: 2021-01-13 | Stop reason: SDUPTHER

## 2020-12-15 RX ORDER — PROMETHAZINE HYDROCHLORIDE AND DEXTROMETHORPHAN HYDROBROMIDE 6.25; 15 MG/5ML; MG/5ML
5 SYRUP ORAL
Qty: 240 ML | Refills: 0 | Status: SHIPPED | OUTPATIENT
Start: 2020-12-15 | End: 2022-03-29

## 2020-12-15 RX ORDER — FLUTICASONE PROPIONATE 50 MCG
2 SPRAY, SUSPENSION (ML) NASAL DAILY
Qty: 16 ML | Refills: 0 | Status: SHIPPED | OUTPATIENT
Start: 2020-12-15 | End: 2022-03-29

## 2020-12-27 NOTE — PROGRESS NOTES
The patient location is: [place]  The chief complaint leading to consultation is: Sinusitis    Visit type: audiovisual    Face to Face time with patient: 10 minutes  10 minutes of total time spent on the encounter, which includes face to face time and non-face to face time preparing to see the patient (eg, review of tests), Obtaining and/or reviewing separately obtained history, Documenting clinical information in the electronic or other health record, Independently interpreting results (not separately reported) and communicating results to the patient/family/caregiver, or Care coordination (not separately reported).         Each patient to whom he or she provides medical services by telemedicine is:  (1) informed of the relationship between the physician and patient and the respective role of any other health care provider with respect to management of the patient; and (2) notified that he or she may decline to receive medical services by telemedicine and may withdraw from such care at any time.    Notes:     Subjective:       Patient ID: Shadia Johnson is a 35 y.o. female.    Chief Complaint: Sinusitis    Sinusitis  This is a new problem. The current episode started in the past 7 days. Associated symptoms include congestion, coughing, headaches and a sore throat.   Fever   This is a new problem. The current episode started yesterday. The problem occurs daily. The problem has been gradually improving. The maximum temperature noted was 100 to 100.9 F. The temperature was taken using a tympanic thermometer. Associated symptoms include congestion, coughing, headaches, muscle aches, nausea and a sore throat. She has tried NSAIDs, acetaminophen, cool baths and fluids for the symptoms. The treatment provided no relief.     Review of Systems   Constitutional: Positive for fever.   HENT: Positive for nasal congestion and sore throat.    Respiratory: Positive for cough.    Gastrointestinal: Positive for nausea.    Neurological: Positive for headaches.         Objective:     There were no vitals taken for this visit.    Physical Exam  Constitutional:       General: She is not in acute distress.     Appearance: She is ill-appearing.   HENT:      Nose:      Comments: Patient was asked to tap under eyes which she did and reports that it was tender  Pulmonary:      Effort: No respiratory distress.   Neurological:      Mental Status: She is alert.         Assessment:       1. Acute rhinosinusitis        Plan:       Shadia was seen today for sinusitis.    Diagnoses and all orders for this visit:    Acute rhinosinusitis  -     promethazine-dextromethorphan (PROMETHAZINE-DM) 6.25-15 mg/5 mL Syrp; Take 5 mLs by mouth every 4 to 6 hours as needed.  -     fluticasone propionate (FLONASE) 50 mcg/actuation nasal spray; 2 sprays (100 mcg total) by Each Nostril route once daily.  -     ipratropium (ATROVENT) 42 mcg (0.06 %) nasal spray; 2 sprays by Nasal route 4 (four) times daily.  -     doxycycline (VIBRAMYCIN) 100 MG Cap; Take 1 capsule (100 mg total) by mouth 2 (two) times daily. for 7 days      Advised patient to use an nasal sprays as prescribed.  Patient instructed on how to use the nasal sprays.   Given severity of symptoms will start on antibiotic regimen.  Advised using a probiotic or eating a yogurt every day to minimize GI side effects of antibiotics.

## 2021-01-04 ENCOUNTER — PATIENT MESSAGE (OUTPATIENT)
Dept: ADMINISTRATIVE | Facility: HOSPITAL | Age: 36
End: 2021-01-04

## 2021-01-13 DIAGNOSIS — J01.90 ACUTE RHINOSINUSITIS: ICD-10-CM

## 2021-01-13 RX ORDER — IPRATROPIUM BROMIDE 42 UG/1
2 SPRAY, METERED NASAL 4 TIMES DAILY
Qty: 15 ML | Refills: 0 | Status: SHIPPED | OUTPATIENT
Start: 2021-01-13 | End: 2021-01-25 | Stop reason: SDUPTHER

## 2021-01-25 DIAGNOSIS — J01.90 ACUTE RHINOSINUSITIS: ICD-10-CM

## 2021-01-25 RX ORDER — IPRATROPIUM BROMIDE 42 UG/1
2 SPRAY, METERED NASAL 4 TIMES DAILY
Qty: 15 ML | Refills: 0 | Status: SHIPPED | OUTPATIENT
Start: 2021-01-25 | End: 2021-02-04 | Stop reason: SDUPTHER

## 2021-02-04 DIAGNOSIS — J01.90 ACUTE RHINOSINUSITIS: ICD-10-CM

## 2021-02-04 RX ORDER — IPRATROPIUM BROMIDE 42 UG/1
2 SPRAY, METERED NASAL 4 TIMES DAILY
Qty: 15 ML | Refills: 0 | Status: SHIPPED | OUTPATIENT
Start: 2021-02-04 | End: 2022-08-18

## 2021-02-08 DIAGNOSIS — J01.90 ACUTE RHINOSINUSITIS: ICD-10-CM

## 2021-02-08 RX ORDER — IPRATROPIUM BROMIDE 42 UG/1
2 SPRAY, METERED NASAL 4 TIMES DAILY
Qty: 15 ML | Refills: 0 | OUTPATIENT
Start: 2021-02-08

## 2021-02-10 DIAGNOSIS — E11.9 TYPE 2 DIABETES MELLITUS WITHOUT COMPLICATION: ICD-10-CM

## 2021-02-11 DIAGNOSIS — J01.90 ACUTE RHINOSINUSITIS: ICD-10-CM

## 2021-02-11 RX ORDER — IPRATROPIUM BROMIDE 42 UG/1
2 SPRAY, METERED NASAL 4 TIMES DAILY
Qty: 15 ML | Refills: 0 | OUTPATIENT
Start: 2021-02-11

## 2021-04-06 ENCOUNTER — PATIENT MESSAGE (OUTPATIENT)
Dept: ADMINISTRATIVE | Facility: HOSPITAL | Age: 36
End: 2021-04-06

## 2021-04-08 DIAGNOSIS — E11.9 TYPE 2 DIABETES MELLITUS WITHOUT COMPLICATION: ICD-10-CM

## 2021-04-15 DIAGNOSIS — I10 HYPERTENSION, BENIGN: ICD-10-CM

## 2021-04-15 DIAGNOSIS — I47.10 PSVT (PAROXYSMAL SUPRAVENTRICULAR TACHYCARDIA): ICD-10-CM

## 2021-04-18 RX ORDER — METOPROLOL SUCCINATE 25 MG/1
25 TABLET, EXTENDED RELEASE ORAL DAILY
Qty: 90 TABLET | Refills: 0 | Status: SHIPPED | OUTPATIENT
Start: 2021-04-18 | End: 2022-03-29 | Stop reason: SDUPTHER

## 2021-04-19 ENCOUNTER — PATIENT OUTREACH (OUTPATIENT)
Dept: ADMINISTRATIVE | Facility: OTHER | Age: 36
End: 2021-04-19

## 2021-04-21 ENCOUNTER — OFFICE VISIT (OUTPATIENT)
Dept: OBSTETRICS AND GYNECOLOGY | Facility: CLINIC | Age: 36
End: 2021-04-21
Payer: MEDICARE

## 2021-04-21 VITALS
HEIGHT: 65 IN | SYSTOLIC BLOOD PRESSURE: 130 MMHG | WEIGHT: 192.88 LBS | BODY MASS INDEX: 32.14 KG/M2 | DIASTOLIC BLOOD PRESSURE: 86 MMHG

## 2021-04-21 DIAGNOSIS — Z01.419 WELL WOMAN EXAM WITH ROUTINE GYNECOLOGICAL EXAM: Primary | ICD-10-CM

## 2021-04-21 DIAGNOSIS — N95.1 VASOMOTOR SYMPTOMS DUE TO MENOPAUSE: ICD-10-CM

## 2021-04-21 PROCEDURE — 3008F BODY MASS INDEX DOCD: CPT | Mod: CPTII,S$GLB,, | Performed by: OBSTETRICS & GYNECOLOGY

## 2021-04-21 PROCEDURE — 1126F AMNT PAIN NOTED NONE PRSNT: CPT | Mod: S$GLB,,, | Performed by: OBSTETRICS & GYNECOLOGY

## 2021-04-21 PROCEDURE — 99999 PR PBB SHADOW E&M-EST. PATIENT-LVL III: CPT | Mod: PBBFAC,,, | Performed by: OBSTETRICS & GYNECOLOGY

## 2021-04-21 PROCEDURE — G0101 CA SCREEN;PELVIC/BREAST EXAM: HCPCS | Mod: S$GLB,,, | Performed by: OBSTETRICS & GYNECOLOGY

## 2021-04-21 PROCEDURE — 3008F PR BODY MASS INDEX (BMI) DOCUMENTED: ICD-10-PCS | Mod: CPTII,S$GLB,, | Performed by: OBSTETRICS & GYNECOLOGY

## 2021-04-21 PROCEDURE — 99999 PR PBB SHADOW E&M-EST. PATIENT-LVL III: ICD-10-PCS | Mod: PBBFAC,,, | Performed by: OBSTETRICS & GYNECOLOGY

## 2021-04-21 PROCEDURE — G0101 PR CA SCREEN;PELVIC/BREAST EXAM: ICD-10-PCS | Mod: S$GLB,,, | Performed by: OBSTETRICS & GYNECOLOGY

## 2021-04-21 PROCEDURE — 1126F PR PAIN SEVERITY QUANTIFIED, NO PAIN PRESENT: ICD-10-PCS | Mod: S$GLB,,, | Performed by: OBSTETRICS & GYNECOLOGY

## 2021-04-21 RX ORDER — ESTRADIOL 0.5 MG/1
0.5 TABLET ORAL DAILY
Qty: 30 TABLET | Refills: 11 | Status: SHIPPED | OUTPATIENT
Start: 2021-04-21 | End: 2021-09-15

## 2021-06-01 ENCOUNTER — PATIENT OUTREACH (OUTPATIENT)
Dept: ADMINISTRATIVE | Facility: HOSPITAL | Age: 36
End: 2021-06-01

## 2021-06-21 ENCOUNTER — TELEPHONE (OUTPATIENT)
Dept: FAMILY MEDICINE | Facility: CLINIC | Age: 36
End: 2021-06-21

## 2021-06-25 ENCOUNTER — TELEPHONE (OUTPATIENT)
Dept: FAMILY MEDICINE | Facility: CLINIC | Age: 36
End: 2021-06-25

## 2021-07-02 ENCOUNTER — PES CALL (OUTPATIENT)
Dept: ADMINISTRATIVE | Facility: CLINIC | Age: 36
End: 2021-07-02

## 2021-08-12 ENCOUNTER — PES CALL (OUTPATIENT)
Dept: ADMINISTRATIVE | Facility: CLINIC | Age: 36
End: 2021-08-12

## 2021-09-14 ENCOUNTER — PATIENT MESSAGE (OUTPATIENT)
Dept: OBSTETRICS AND GYNECOLOGY | Facility: CLINIC | Age: 36
End: 2021-09-14

## 2021-09-15 ENCOUNTER — PATIENT MESSAGE (OUTPATIENT)
Dept: OBSTETRICS AND GYNECOLOGY | Facility: CLINIC | Age: 36
End: 2021-09-15

## 2021-09-15 RX ORDER — ESTRADIOL 1 MG/1
1 TABLET ORAL DAILY
Qty: 30 TABLET | Refills: 6 | Status: SHIPPED | OUTPATIENT
Start: 2021-09-15 | End: 2022-08-02 | Stop reason: SDUPTHER

## 2022-03-28 DIAGNOSIS — I10 HYPERTENSION, BENIGN: ICD-10-CM

## 2022-03-28 DIAGNOSIS — I47.10 PSVT (PAROXYSMAL SUPRAVENTRICULAR TACHYCARDIA): ICD-10-CM

## 2022-03-28 RX ORDER — METOPROLOL SUCCINATE 25 MG/1
25 TABLET, EXTENDED RELEASE ORAL DAILY
Qty: 90 TABLET | Refills: 0 | OUTPATIENT
Start: 2022-03-28

## 2022-03-28 NOTE — TELEPHONE ENCOUNTER
Patient has not been in the office since Feb 2020, and virtual visit in Dec 2020. I cannot fill her medication. She needs an appointment

## 2022-03-29 ENCOUNTER — OFFICE VISIT (OUTPATIENT)
Dept: FAMILY MEDICINE | Facility: CLINIC | Age: 37
End: 2022-03-29
Payer: MEDICARE

## 2022-03-29 ENCOUNTER — PATIENT MESSAGE (OUTPATIENT)
Dept: FAMILY MEDICINE | Facility: CLINIC | Age: 37
End: 2022-03-29

## 2022-03-29 VITALS
HEART RATE: 98 BPM | RESPIRATION RATE: 18 BRPM | OXYGEN SATURATION: 99 % | SYSTOLIC BLOOD PRESSURE: 132 MMHG | TEMPERATURE: 98 F | WEIGHT: 201.75 LBS | HEIGHT: 65 IN | BODY MASS INDEX: 33.61 KG/M2 | DIASTOLIC BLOOD PRESSURE: 88 MMHG

## 2022-03-29 DIAGNOSIS — I47.10 PSVT (PAROXYSMAL SUPRAVENTRICULAR TACHYCARDIA): ICD-10-CM

## 2022-03-29 DIAGNOSIS — E11.9 DIABETES MELLITUS WITHOUT COMPLICATION: ICD-10-CM

## 2022-03-29 DIAGNOSIS — R22.1 LUMP IN NECK: Primary | ICD-10-CM

## 2022-03-29 DIAGNOSIS — Z11.59 NEED FOR HEPATITIS C SCREENING TEST: ICD-10-CM

## 2022-03-29 DIAGNOSIS — I10 HYPERTENSION, BENIGN: ICD-10-CM

## 2022-03-29 PROCEDURE — 99499 RISK ADDL DX/OHS AUDIT: ICD-10-PCS | Mod: HCNC,S$GLB,, | Performed by: FAMILY MEDICINE

## 2022-03-29 PROCEDURE — 3079F PR MOST RECENT DIASTOLIC BLOOD PRESSURE 80-89 MM HG: ICD-10-PCS | Mod: CPTII,S$GLB,, | Performed by: FAMILY MEDICINE

## 2022-03-29 PROCEDURE — 3066F NEPHROPATHY DOC TX: CPT | Mod: CPTII,S$GLB,, | Performed by: FAMILY MEDICINE

## 2022-03-29 PROCEDURE — 3066F PR DOCUMENTATION OF TREATMENT FOR NEPHROPATHY: ICD-10-PCS | Mod: CPTII,S$GLB,, | Performed by: FAMILY MEDICINE

## 2022-03-29 PROCEDURE — 3061F NEG MICROALBUMINURIA REV: CPT | Mod: CPTII,S$GLB,, | Performed by: FAMILY MEDICINE

## 2022-03-29 PROCEDURE — 99214 OFFICE O/P EST MOD 30 MIN: CPT | Mod: PBBFAC,PN | Performed by: FAMILY MEDICINE

## 2022-03-29 PROCEDURE — 3008F PR BODY MASS INDEX (BMI) DOCUMENTED: ICD-10-PCS | Mod: CPTII,S$GLB,, | Performed by: FAMILY MEDICINE

## 2022-03-29 PROCEDURE — 1159F MED LIST DOCD IN RCRD: CPT | Mod: CPTII,S$GLB,, | Performed by: FAMILY MEDICINE

## 2022-03-29 PROCEDURE — 99499 UNLISTED E&M SERVICE: CPT | Mod: HCNC,S$GLB,, | Performed by: FAMILY MEDICINE

## 2022-03-29 PROCEDURE — 3044F PR MOST RECENT HEMOGLOBIN A1C LEVEL <7.0%: ICD-10-PCS | Mod: CPTII,S$GLB,, | Performed by: FAMILY MEDICINE

## 2022-03-29 PROCEDURE — 1159F PR MEDICATION LIST DOCUMENTED IN MEDICAL RECORD: ICD-10-PCS | Mod: CPTII,S$GLB,, | Performed by: FAMILY MEDICINE

## 2022-03-29 PROCEDURE — 3044F HG A1C LEVEL LT 7.0%: CPT | Mod: CPTII,S$GLB,, | Performed by: FAMILY MEDICINE

## 2022-03-29 PROCEDURE — 3075F PR MOST RECENT SYSTOLIC BLOOD PRESS GE 130-139MM HG: ICD-10-PCS | Mod: CPTII,S$GLB,, | Performed by: FAMILY MEDICINE

## 2022-03-29 PROCEDURE — 99999 PR PBB SHADOW E&M-EST. PATIENT-LVL IV: ICD-10-PCS | Mod: PBBFAC,,, | Performed by: FAMILY MEDICINE

## 2022-03-29 PROCEDURE — 3075F SYST BP GE 130 - 139MM HG: CPT | Mod: CPTII,S$GLB,, | Performed by: FAMILY MEDICINE

## 2022-03-29 PROCEDURE — 3079F DIAST BP 80-89 MM HG: CPT | Mod: CPTII,S$GLB,, | Performed by: FAMILY MEDICINE

## 2022-03-29 PROCEDURE — 3061F PR NEG MICROALBUMINURIA RESULT DOCUMENTED/REVIEW: ICD-10-PCS | Mod: CPTII,S$GLB,, | Performed by: FAMILY MEDICINE

## 2022-03-29 PROCEDURE — 1160F RVW MEDS BY RX/DR IN RCRD: CPT | Mod: CPTII,S$GLB,, | Performed by: FAMILY MEDICINE

## 2022-03-29 PROCEDURE — 99214 OFFICE O/P EST MOD 30 MIN: CPT | Mod: S$GLB,,, | Performed by: FAMILY MEDICINE

## 2022-03-29 PROCEDURE — 99999 PR PBB SHADOW E&M-EST. PATIENT-LVL IV: CPT | Mod: PBBFAC,,, | Performed by: FAMILY MEDICINE

## 2022-03-29 PROCEDURE — 1160F PR REVIEW ALL MEDS BY PRESCRIBER/CLIN PHARMACIST DOCUMENTED: ICD-10-PCS | Mod: CPTII,S$GLB,, | Performed by: FAMILY MEDICINE

## 2022-03-29 PROCEDURE — 3008F BODY MASS INDEX DOCD: CPT | Mod: CPTII,S$GLB,, | Performed by: FAMILY MEDICINE

## 2022-03-29 PROCEDURE — 99214 PR OFFICE/OUTPT VISIT, EST, LEVL IV, 30-39 MIN: ICD-10-PCS | Mod: S$GLB,,, | Performed by: FAMILY MEDICINE

## 2022-03-29 RX ORDER — METOPROLOL SUCCINATE 50 MG/1
50 TABLET, EXTENDED RELEASE ORAL DAILY
Qty: 90 TABLET | Refills: 2 | Status: SHIPPED | OUTPATIENT
Start: 2022-03-29 | End: 2023-01-24

## 2022-03-29 NOTE — PROGRESS NOTES
03/29/22 1300   Depression Patient Health Questionnaire (PHQ-2)   Over the last two weeks how often have you been bothered by little interest or pleasure in doing things 0   Over the last two weeks how often have you been bothered by feeling down, depressed or hopeless 0   PHQ-2 Total Score 0

## 2022-04-10 NOTE — PROGRESS NOTES
Subjective:       Patient ID: Shadia Johnson is a 36 y.o. female.    Chief Complaint: Mass    HPI   36-year-old female comes in for evaluation of a lump on the right side of her neck for the last week. It is nontender. She reports no fevers or chills. No recent infections  She is taking her BP medication as prescribed.     Review of Systems   Constitutional: Negative for activity change and unexpected weight change.   HENT: Negative for hearing loss, rhinorrhea and trouble swallowing.    Eyes: Negative for discharge and visual disturbance.   Respiratory: Negative for chest tightness and wheezing.    Cardiovascular: Negative for chest pain and palpitations.   Gastrointestinal: Negative for blood in stool, constipation, diarrhea and vomiting.   Endocrine: Negative for polydipsia and polyuria.   Genitourinary: Positive for difficulty urinating, dysuria and hematuria. Negative for menstrual problem.   Musculoskeletal: Negative for arthralgias, joint swelling and neck pain.   Neurological: Positive for headaches. Negative for weakness.   Psychiatric/Behavioral: Negative for confusion and dysphoric mood.         Objective:      Physical Exam  HENT:      Head: Normocephalic and atraumatic.      Right Ear: External ear normal.      Left Ear: External ear normal.      Nose: Nose normal.      Mouth/Throat:      Mouth: Mucous membranes are moist.   Eyes:      Pupils: Pupils are equal, round, and reactive to light.   Neck:      Thyroid: No thyroid mass or thyroid tenderness.     Cardiovascular:      Rate and Rhythm: Normal rate and regular rhythm.   Musculoskeletal:      Cervical back: Normal range of motion. Normal range of motion.   Neurological:      Mental Status: She is alert.         Assessment:       Problem List Items Addressed This Visit        Cardiac/Vascular    PSVT (paroxysmal supraventricular tachycardia)    Relevant Medications    metoprolol succinate (TOPROL-XL) 50 MG 24 hr tablet       Endocrine    Diabetes  mellitus without complication    Relevant Orders    Comprehensive Metabolic Panel (Completed)    Hemoglobin A1C (Completed)    Microalbumin/Creatinine Ratio, Urine (Completed)    Lipid Panel (Completed)      Other Visit Diagnoses     Lump in neck    -  Primary    Hypertension, benign        Relevant Medications    metoprolol succinate (TOPROL-XL) 50 MG 24 hr tablet    Other Relevant Orders    Comprehensive Metabolic Panel (Completed)    Microalbumin/Creatinine Ratio, Urine (Completed)    Lipid Panel (Completed)    Need for hepatitis C screening test        Relevant Orders    Hepatitis C Antibody (Completed)          Plan:       Shadia was seen today for mass.    Diagnoses and all orders for this visit:    Lump in neck  Appears to associated with neck muscle stiffness.   Advised warm compresses.  Advised if not resolved in 2 weeks, will u;trasound    Hypertension, benign  -     Comprehensive Metabolic Panel; Future  -     Microalbumin/Creatinine Ratio, Urine; Future  -     Lipid Panel; Future  -     metoprolol succinate (TOPROL-XL) 50 MG 24 hr tablet; Take 1 tablet (50 mg total) by mouth once daily.  Continue Toprol 50 daily  Check labs    PSVT (paroxysmal supraventricular tachycardia)  -     metoprolol succinate (TOPROL-XL) 50 MG 24 hr tablet; Take 1 tablet (50 mg total) by mouth once daily.  Continue Toprol 50 mg    Diabetes mellitus without complication  -     Comprehensive Metabolic Panel; Future  -     Hemoglobin A1C; Future  -     Microalbumin/Creatinine Ratio, Urine; Future  -     Lipid Panel; Future  Hx of DM, which improved after weight loss  Check labs    Need for hepatitis C screening test  -     Hepatitis C Antibody; Future  Screen for HCV as per USPSTF guidelines

## 2022-08-01 ENCOUNTER — PATIENT MESSAGE (OUTPATIENT)
Dept: OBSTETRICS AND GYNECOLOGY | Facility: CLINIC | Age: 37
End: 2022-08-01
Payer: MEDICARE

## 2022-08-02 ENCOUNTER — OFFICE VISIT (OUTPATIENT)
Dept: OBSTETRICS AND GYNECOLOGY | Facility: CLINIC | Age: 37
End: 2022-08-02
Payer: MEDICARE

## 2022-08-02 VITALS
WEIGHT: 216.94 LBS | BODY MASS INDEX: 36.1 KG/M2 | DIASTOLIC BLOOD PRESSURE: 110 MMHG | SYSTOLIC BLOOD PRESSURE: 158 MMHG

## 2022-08-02 DIAGNOSIS — N95.1 VASOMOTOR SYMPTOMS DUE TO MENOPAUSE: ICD-10-CM

## 2022-08-02 DIAGNOSIS — Z01.419 WELL WOMAN EXAM WITH ROUTINE GYNECOLOGICAL EXAM: Primary | ICD-10-CM

## 2022-08-02 DIAGNOSIS — Z79.890 HORMONE REPLACEMENT THERAPY (HRT): ICD-10-CM

## 2022-08-02 PROCEDURE — 99999 PR PBB SHADOW E&M-EST. PATIENT-LVL II: CPT | Mod: PBBFAC,,, | Performed by: OBSTETRICS & GYNECOLOGY

## 2022-08-02 PROCEDURE — 3061F NEG MICROALBUMINURIA REV: CPT | Mod: CPTII,S$GLB,, | Performed by: OBSTETRICS & GYNECOLOGY

## 2022-08-02 PROCEDURE — 3008F BODY MASS INDEX DOCD: CPT | Mod: CPTII,S$GLB,, | Performed by: OBSTETRICS & GYNECOLOGY

## 2022-08-02 PROCEDURE — 99999 PR PBB SHADOW E&M-EST. PATIENT-LVL II: ICD-10-PCS | Mod: PBBFAC,,, | Performed by: OBSTETRICS & GYNECOLOGY

## 2022-08-02 PROCEDURE — 3066F PR DOCUMENTATION OF TREATMENT FOR NEPHROPATHY: ICD-10-PCS | Mod: CPTII,S$GLB,, | Performed by: OBSTETRICS & GYNECOLOGY

## 2022-08-02 PROCEDURE — G0101 PR CA SCREEN;PELVIC/BREAST EXAM: ICD-10-PCS | Mod: S$GLB,,, | Performed by: OBSTETRICS & GYNECOLOGY

## 2022-08-02 PROCEDURE — 3066F NEPHROPATHY DOC TX: CPT | Mod: CPTII,S$GLB,, | Performed by: OBSTETRICS & GYNECOLOGY

## 2022-08-02 PROCEDURE — 3077F PR MOST RECENT SYSTOLIC BLOOD PRESSURE >= 140 MM HG: ICD-10-PCS | Mod: CPTII,S$GLB,, | Performed by: OBSTETRICS & GYNECOLOGY

## 2022-08-02 PROCEDURE — 3077F SYST BP >= 140 MM HG: CPT | Mod: CPTII,S$GLB,, | Performed by: OBSTETRICS & GYNECOLOGY

## 2022-08-02 PROCEDURE — 3008F PR BODY MASS INDEX (BMI) DOCUMENTED: ICD-10-PCS | Mod: CPTII,S$GLB,, | Performed by: OBSTETRICS & GYNECOLOGY

## 2022-08-02 PROCEDURE — 3080F PR MOST RECENT DIASTOLIC BLOOD PRESSURE >= 90 MM HG: ICD-10-PCS | Mod: CPTII,S$GLB,, | Performed by: OBSTETRICS & GYNECOLOGY

## 2022-08-02 PROCEDURE — 3080F DIAST BP >= 90 MM HG: CPT | Mod: CPTII,S$GLB,, | Performed by: OBSTETRICS & GYNECOLOGY

## 2022-08-02 PROCEDURE — 3044F PR MOST RECENT HEMOGLOBIN A1C LEVEL <7.0%: ICD-10-PCS | Mod: CPTII,S$GLB,, | Performed by: OBSTETRICS & GYNECOLOGY

## 2022-08-02 PROCEDURE — 3061F PR NEG MICROALBUMINURIA RESULT DOCUMENTED/REVIEW: ICD-10-PCS | Mod: CPTII,S$GLB,, | Performed by: OBSTETRICS & GYNECOLOGY

## 2022-08-02 PROCEDURE — 1159F PR MEDICATION LIST DOCUMENTED IN MEDICAL RECORD: ICD-10-PCS | Mod: CPTII,S$GLB,, | Performed by: OBSTETRICS & GYNECOLOGY

## 2022-08-02 PROCEDURE — 3044F HG A1C LEVEL LT 7.0%: CPT | Mod: CPTII,S$GLB,, | Performed by: OBSTETRICS & GYNECOLOGY

## 2022-08-02 PROCEDURE — 1159F MED LIST DOCD IN RCRD: CPT | Mod: CPTII,S$GLB,, | Performed by: OBSTETRICS & GYNECOLOGY

## 2022-08-02 PROCEDURE — G0101 CA SCREEN;PELVIC/BREAST EXAM: HCPCS | Mod: S$GLB,,, | Performed by: OBSTETRICS & GYNECOLOGY

## 2022-08-02 RX ORDER — ESTRADIOL 1 MG/1
1 TABLET ORAL DAILY
Qty: 30 TABLET | Refills: 6 | Status: SHIPPED | OUTPATIENT
Start: 2022-08-02 | End: 2023-02-27

## 2022-08-02 NOTE — PROGRESS NOTES
SUBJECTIVE:   Shadia Johnson is a 37 y.o. female   for annual well woman exam. No LMP recorded (lmp unknown). Patient has had a hysterectomy..      S/p supracervical hyst @ age 24 for bleeding after delivery, then cervical removal with BSO for endometriosis - stage 4  On HRT since then - currently on estradiol 1 mg. Helping with hot flashes but not vaginal dryness  Denies abnl pap    Past Medical History:   Diagnosis Date    Anemia     Anxiety     Asthma     Childhood    Depression     Endometriosis of pelvis     Endometriosis, severe     Stage 4    History of endometriosis     Hypertension     Interstitial cystitis     Ulcer     hunners     Past Surgical History:   Procedure Laterality Date    COLONOSCOPY      HYSTERECTOMY      endometriosis    interSTIM IMPLANT      LAPOROSCOPY      left salpingectomy      MAMMOGRAM  2010    TRACHELECTOMY  12     Social History     Socioeconomic History    Marital status:    Tobacco Use    Smoking status: Never Smoker    Smokeless tobacco: Never Used    Tobacco comment: , .  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  Disabled, SSDI.  Kids: 9 & 2.   Substance and Sexual Activity    Alcohol use: No    Drug use: No    Sexual activity: Yes     Partners: Male     Birth control/protection: Surgical     Comment: : Todd  ( at hotel).    Social History Narrative    .  Occup:  Disabled.       Social Determinants of Health     Financial Resource Strain: Low Risk     Difficulty of Paying Living Expenses: Not very hard   Food Insecurity: No Food Insecurity    Worried About Running Out of Food in the Last Year: Never true    Ran Out of Food in the Last Year: Never true   Transportation Needs: No Transportation Needs    Lack of Transportation (Medical): No    Lack of Transportation (Non-Medical): No   Physical Activity: Sufficiently Active    Days of Exercise per Week: 5 days    Minutes of Exercise per  Session: 60 min   Stress: No Stress Concern Present    Feeling of Stress : Only a little   Social Connections: Unknown    Frequency of Communication with Friends and Family: More than three times a week    Frequency of Social Gatherings with Friends and Family: More than three times a week    Active Member of Clubs or Organizations: No    Attends Club or Organization Meetings: Never    Marital Status: Living with partner   Housing Stability: High Risk    Unable to Pay for Housing in the Last Year: Yes    Number of Places Lived in the Last Year: 2    Unstable Housing in the Last Year: No     Family History   Problem Relation Age of Onset    Alcohol abuse Mother     Cancer Maternal Grandmother         skin    Depression Maternal Grandmother     Heart disease Paternal Uncle     Hyperlipidemia Paternal Uncle      OB History    Para Term  AB Living   2 2       2   SAB IAB Ectopic Multiple Live Births                  # Outcome Date GA Lbr Erasmo/2nd Weight Sex Delivery Anes PTL Lv   2 Para      Vag-Spont      1 Para      Vag-Spont         Obstetric Comments   S/p supracervical hyst @ age 24 for bleeding after delivery, then cervical removal with BSO for endometriosis - sstage 4   On HRT since then   H/o right lumpectomy - benign per pt   Denies abnl pap         Current Outpatient Medications   Medication Sig Dispense Refill    albuterol (VENTOLIN HFA) 90 mcg/actuation inhaler INHALE 2 puffs into the lungs EVERY 6 HOURS AS NEEDED 36 g 5    estradioL (ESTRACE) 1 MG tablet Take 1 tablet (1 mg total) by mouth once daily. 30 tablet 6    ipratropium (ATROVENT) 42 mcg (0.06 %) nasal spray 2 sprays by Nasal route 4 (four) times daily. 15 mL 0    metoprolol succinate (TOPROL-XL) 50 MG 24 hr tablet Take 1 tablet (50 mg total) by mouth once daily. 90 tablet 2     No current facility-administered medications for this visit.     Allergies: Alcohol, unspecified       ROS:  GENERAL: Denies weight gain or  weight loss. Feeling well overall.   SKIN: Denies rash or lesions.   HEAD: Denies head injury or headache.   NODES: Denies enlarged lymph nodes.   CHEST: Denies chest pain or shortness of breath.   CARDIOVASCULAR: Denies palpitations or left sided chest pain.   ABDOMEN: No abdominal pain, constipation, diarrhea, nausea, vomiting or rectal bleeding.   URINARY: No frequency, dysuria, hematuria, or burning on urination.  REPRODUCTIVE: Denies vaginal discharge, abnormal vaginal bleeding, lesions, pelvic pain  BREASTS: The patient performs breast self-examination and denies pain, lumps, or nipple discharge.   HEMATOLOGIC: No easy bruisability or excessive bleeding.  MUSCULOSKELETAL: Denies joint pain or swelling.   NEUROLOGIC: Denies syncope or weakness.   PSYCHIATRIC: Denies depression, anxiety or mood swings.      OBJECTIVE:   BP (!) 158/110   Wt 98.4 kg (216 lb 14.9 oz)   LMP  (LMP Unknown)   BMI 36.10 kg/m²   The patient appears well, alert, oriented x 3, in no distress.  PSYCH:  Normal, full range of affect  NECK: negative, no thyromegaly, trachea midline  SKIN: normal, good color, good turgor and no acne, striae, hirsutism  BREAST EXAM: breasts appear normal, no suspicious masses, no skin or nipple changes or axillary nodes  ABDOMEN: soft, non-tender; bowel sounds normal; no masses,  no organomegaly and no hernias, masses, or hepatosplenomegaly  GENITALIA: normal external genitalia, no erythema, no discharge  BLADDER: soft  URETHRA: normal appearing urethra with no masses, tenderness or lesions and normal urethra, normal urethral meatus  VAGINA: Normal  CERVIX: absent  UTERUS: uterus absent  ADNEXA: no mass, fullness, tenderness      ASSESSMENT:   1. Health maintenance  -pap not indicated  -counseled on exercise and healthy diet, weight loss  -bone health:  Discussed Vitamin D and Calcium supplementation, weight bearing exercises  2.  Discussed safety at home/school/work, healthy and balanced diet, sleep  hygiene, as well as violence/weapons exposure.   3.  Refilled on HRT, vaginal ring for vaginal dryness.  Pt does not like cream since it could be messy  4.  HTN: advised to monitor closely and f/u with PCP

## 2022-08-18 ENCOUNTER — OFFICE VISIT (OUTPATIENT)
Dept: FAMILY MEDICINE | Facility: CLINIC | Age: 37
End: 2022-08-18
Payer: MEDICARE

## 2022-08-18 DIAGNOSIS — E78.2 MIXED DYSLIPIDEMIA: ICD-10-CM

## 2022-08-18 DIAGNOSIS — I10 HYPERTENSION, BENIGN: ICD-10-CM

## 2022-08-18 DIAGNOSIS — E11.9 DIABETES MELLITUS WITHOUT COMPLICATION: ICD-10-CM

## 2022-08-18 DIAGNOSIS — R21 RASH: Primary | ICD-10-CM

## 2022-08-18 DIAGNOSIS — E66.01 CLASS 2 SEVERE OBESITY DUE TO EXCESS CALORIES WITH SERIOUS COMORBIDITY AND BODY MASS INDEX (BMI) OF 36.0 TO 36.9 IN ADULT: ICD-10-CM

## 2022-08-18 PROCEDURE — 99499 UNLISTED E&M SERVICE: CPT | Mod: 95,,, | Performed by: FAMILY MEDICINE

## 2022-08-18 PROCEDURE — 99214 OFFICE O/P EST MOD 30 MIN: CPT | Mod: 95,,, | Performed by: FAMILY MEDICINE

## 2022-08-18 PROCEDURE — 1160F PR REVIEW ALL MEDS BY PRESCRIBER/CLIN PHARMACIST DOCUMENTED: ICD-10-PCS | Mod: CPTII,95,, | Performed by: FAMILY MEDICINE

## 2022-08-18 PROCEDURE — 3066F NEPHROPATHY DOC TX: CPT | Mod: CPTII,95,, | Performed by: FAMILY MEDICINE

## 2022-08-18 PROCEDURE — 3066F PR DOCUMENTATION OF TREATMENT FOR NEPHROPATHY: ICD-10-PCS | Mod: CPTII,95,, | Performed by: FAMILY MEDICINE

## 2022-08-18 PROCEDURE — 99214 PR OFFICE/OUTPT VISIT, EST, LEVL IV, 30-39 MIN: ICD-10-PCS | Mod: 95,,, | Performed by: FAMILY MEDICINE

## 2022-08-18 PROCEDURE — 99499 RISK ADDL DX/OHS AUDIT: ICD-10-PCS | Mod: 95,,, | Performed by: FAMILY MEDICINE

## 2022-08-18 PROCEDURE — 3044F PR MOST RECENT HEMOGLOBIN A1C LEVEL <7.0%: ICD-10-PCS | Mod: CPTII,95,, | Performed by: FAMILY MEDICINE

## 2022-08-18 PROCEDURE — 3061F NEG MICROALBUMINURIA REV: CPT | Mod: CPTII,95,, | Performed by: FAMILY MEDICINE

## 2022-08-18 PROCEDURE — 1160F RVW MEDS BY RX/DR IN RCRD: CPT | Mod: CPTII,95,, | Performed by: FAMILY MEDICINE

## 2022-08-18 PROCEDURE — 3061F PR NEG MICROALBUMINURIA RESULT DOCUMENTED/REVIEW: ICD-10-PCS | Mod: CPTII,95,, | Performed by: FAMILY MEDICINE

## 2022-08-18 PROCEDURE — 3044F HG A1C LEVEL LT 7.0%: CPT | Mod: CPTII,95,, | Performed by: FAMILY MEDICINE

## 2022-08-18 PROCEDURE — 1159F PR MEDICATION LIST DOCUMENTED IN MEDICAL RECORD: ICD-10-PCS | Mod: CPTII,95,, | Performed by: FAMILY MEDICINE

## 2022-08-18 PROCEDURE — 1159F MED LIST DOCD IN RCRD: CPT | Mod: CPTII,95,, | Performed by: FAMILY MEDICINE

## 2022-08-18 RX ORDER — TRIAMCINOLONE ACETONIDE 1 MG/G
OINTMENT TOPICAL 2 TIMES DAILY
Qty: 30 G | Refills: 0 | Status: SHIPPED | OUTPATIENT
Start: 2022-08-18 | End: 2022-08-28

## 2022-08-24 ENCOUNTER — TELEPHONE (OUTPATIENT)
Dept: ADMINISTRATIVE | Facility: HOSPITAL | Age: 37
End: 2022-08-24
Payer: MEDICARE

## 2022-09-18 NOTE — PROGRESS NOTES
The patient location is: [place], [place]  The chief complaint leading to consultation is: Rash    Visit type: audiovisual    Face to Face time with patient: 10 minutes  15 minutes of total time spent on the encounter, which includes face to face time and non-face to face time preparing to see the patient (eg, review of tests), Obtaining and/or reviewing separately obtained history, Documenting clinical information in the electronic or other health record, Independently interpreting results (not separately reported) and communicating results to the patient/family/caregiver, or Care coordination (not separately reported).         Each patient to whom he or she provides medical services by telemedicine is:  (1) informed of the relationship between the physician and patient and the respective role of any other health care provider with respect to management of the patient; and (2) notified that he or she may decline to receive medical services by telemedicine and may withdraw from such care at any time.    Notes:     Subjective:       Patient ID: Shadia Johnson is a 37 y.o. female.    Chief Complaint: Rash    Rash  This is a recurrent problem. The current episode started yesterday. The problem has been gradually worsening since onset. The affected locations include the left hand, left leg, right hand and right leg. The rash is characterized by redness and itchiness. It is unknown if there was an exposure to a precipitant. Associated symptoms include fatigue and joint pain. Pertinent negatives include no anorexia, congestion, cough, eye pain, facial edema, fever, nail changes, rhinorrhea, shortness of breath, sore throat or vomiting. Past treatments include anti-itch cream, antihistamine, cold compress, moisturizer and topical steroids. The treatment provided no relief. Her past medical history is significant for asthma. There is no history of allergies, eczema or varicella.   Review of Systems   Constitutional:   Positive for fatigue. Negative for fever.   HENT:  Negative for nasal congestion, rhinorrhea and sore throat.    Eyes:  Negative for pain.   Respiratory:  Negative for cough and shortness of breath.    Gastrointestinal:  Negative for anorexia and vomiting.   Musculoskeletal:  Positive for joint pain.   Integumentary:  Positive for rash. Negative for nail changes.       Objective:      Physical Exam  Pulmonary:      Effort: Pulmonary effort is normal.   Neurological:      Mental Status: She is alert.   Psychiatric:         Mood and Affect: Mood normal.         Behavior: Behavior normal.       Assessment:       Problem List Items Addressed This Visit          Endocrine    Diabetes mellitus without complication    Relevant Orders    Comprehensive Metabolic Panel    CBC Auto Differential    TSH    Hemoglobin A1C     Other Visit Diagnoses       Rash    -  Primary    Relevant Orders    Ambulatory referral/consult to Dermatology    Comprehensive Metabolic Panel    CBC Auto Differential    TSH    NESTOR Screen w/Reflex    Rheumatoid Factor    CYCLIC CITRUL PEPTIDE ANTIBODY, IGG    Hypertension, benign        Relevant Orders    Comprehensive Metabolic Panel    CBC Auto Differential    TSH    Class 2 severe obesity due to excess calories with serious comorbidity and body mass index (BMI) of 36.0 to 36.9 in adult        Mixed dyslipidemia        Relevant Orders    Comprehensive Metabolic Panel    Lipid Panel              Plan:       Shadia was seen today for rash.    Diagnoses and all orders for this visit:    Rash  -     triamcinolone acetonide 0.1% (KENALOG) 0.1 % ointment; Apply topically 2 (two) times daily. To rash area for 10 days  -     Ambulatory referral/consult to Dermatology; Future  -     Comprehensive Metabolic Panel; Future  -     CBC Auto Differential; Future  -     TSH; Future  -     NESTOR Screen w/Reflex; Future  -     Rheumatoid Factor; Future  -     CYCLIC CITRUL PEPTIDE ANTIBODY, IGG; Future  Topical steroid  course  Labs to evaluate for autoimmune causes    Hypertension, benign  -     Comprehensive Metabolic Panel; Future  -     CBC Auto Differential; Future  -     TSH; Future  Check labs  Needs BP check    Diabetes mellitus without complication  -     Comprehensive Metabolic Panel; Future  -     CBC Auto Differential; Future  -     TSH; Future  -     Hemoglobin A1C; Future  Check labs  Continue diet control    Class 2 severe obesity due to excess calories with serious comorbidity and body mass index (BMI) of 36.0 to 36.9 in adult    Mixed dyslipidemia  -     Comprehensive Metabolic Panel; Future  -     Lipid Panel; Future  Check labs

## 2022-10-28 ENCOUNTER — PATIENT OUTREACH (OUTPATIENT)
Dept: ADMINISTRATIVE | Facility: HOSPITAL | Age: 37
End: 2022-10-28
Payer: MEDICARE

## 2022-12-28 ENCOUNTER — PES CALL (OUTPATIENT)
Dept: ADMINISTRATIVE | Facility: CLINIC | Age: 37
End: 2022-12-28
Payer: MEDICARE

## 2022-12-28 ENCOUNTER — PATIENT MESSAGE (OUTPATIENT)
Dept: ADMINISTRATIVE | Facility: HOSPITAL | Age: 37
End: 2022-12-28
Payer: MEDICARE

## 2022-12-30 DIAGNOSIS — E11.9 TYPE 2 DIABETES MELLITUS WITHOUT COMPLICATION, UNSPECIFIED WHETHER LONG TERM INSULIN USE: ICD-10-CM

## 2023-01-11 ENCOUNTER — PATIENT MESSAGE (OUTPATIENT)
Dept: FAMILY MEDICINE | Facility: CLINIC | Age: 38
End: 2023-01-11

## 2023-01-11 ENCOUNTER — OFFICE VISIT (OUTPATIENT)
Dept: FAMILY MEDICINE | Facility: CLINIC | Age: 38
End: 2023-01-11
Payer: MEDICARE

## 2023-01-11 DIAGNOSIS — E11.9 TYPE 2 DIABETES MELLITUS WITHOUT COMPLICATION, UNSPECIFIED WHETHER LONG TERM INSULIN USE: ICD-10-CM

## 2023-01-11 DIAGNOSIS — L30.8 PAPULAR ECZEMA: Primary | ICD-10-CM

## 2023-01-11 PROCEDURE — 99214 OFFICE O/P EST MOD 30 MIN: CPT | Mod: HCNC,95,, | Performed by: FAMILY MEDICINE

## 2023-01-11 PROCEDURE — 99214 PR OFFICE/OUTPT VISIT, EST, LEVL IV, 30-39 MIN: ICD-10-PCS | Mod: HCNC,95,, | Performed by: FAMILY MEDICINE

## 2023-01-11 RX ORDER — CEPHALEXIN 500 MG/1
500 CAPSULE ORAL EVERY 12 HOURS
Qty: 14 CAPSULE | Refills: 0 | Status: SHIPPED | OUTPATIENT
Start: 2023-01-11 | End: 2023-01-18

## 2023-01-11 RX ORDER — PREDNISONE 10 MG/1
10 TABLET ORAL DAILY
Qty: 30 TABLET | Refills: 0 | Status: SHIPPED | OUTPATIENT
Start: 2023-01-11 | End: 2023-07-15 | Stop reason: ALTCHOICE

## 2023-01-11 NOTE — PROGRESS NOTES
Answers submitted by the patient for this visit:  Rash Questionnaire (Submitted on 1/11/2023)  Chief Complaint: Rash  Chronicity: chronic  Onset: more than 1 month ago  Progression since onset: gradually worsening  Affected locations: torso, back, abdomen, right hand  Characteristics: itchiness  Exposed to: nothing  anorexia: No  congestion: No  cough: No  diarrhea: No  eye pain: No  facial edema: No  fatigue: No  fever: No  joint pain: No  nail changes: Yes  rhinorrhea: No  shortness of breath: No  sore throat: No  vomiting: No  Treatments tried: anti-itch cream, antihistamine, moisturizer  Improvement on treatment: no relief  asthma: No  allergies: No  eczema: No  varicella: No

## 2023-01-11 NOTE — PROGRESS NOTES
Subjective:       Patient ID: Shadia Johnson is a 37 y.o. female.    Chief Complaint: No chief complaint on file.    The patient location is: [place]  The chief complaint leading to consultation is: itching    Visit type: audiovisual    Notes:    Subjective:    HPI: 37 y.o. female presents for a rash. She states she has seen 2 different doctors and it is not going away. It is on her abdomen, hips, and on her lower back. She also gets it occasionally on her hands. She states she has scratched so much. She has been treated with doxycycline and triamcinolone. She state she gets tiny bumps on her hands that are itchy and can burn. She has no new lotions or creams. She showes with a body wash that is hypoallergenic. She has a  and kids, but noone else is itchy. She lotions.  She had a biopsy on her rash and         Face to Face time with patient:   8 minutes of total time spent on the encounter, which includes face to face time and non-face to face time preparing to see the patient (eg, review of tests), Obtaining and/or reviewing separately obtained history, Documenting clinical information in the electronic or other health record, Independently interpreting results (not separately reported) and communicating results to the patient/family/caregiver, or Care coordination (not separately reported).     Each patient to whom he or she provides medical services by telemedicine is:  (1) informed of the relationship between the physician and patient and the respective role of any other health care provider with respect to management of the patient; and (2) notified that he or she may decline to receive medical services by telemedicine and may withdraw from such care at any time.      Rash  This is a chronic problem. The current episode started more than 1 month ago. The problem has been gradually worsening since onset. The affected locations include the torso, back, abdomen and right hand. The rash is characterized  by itchiness. She was exposed to nothing. Associated symptoms include nail changes. Pertinent negatives include no anorexia, congestion, cough, diarrhea, eye pain, facial edema, fatigue, fever, joint pain, rhinorrhea, shortness of breath, sore throat or vomiting. Past treatments include anti-itch cream, antihistamine and moisturizer. The treatment provided no relief. There is no history of allergies, asthma, eczema or varicella.   Review of Systems   Constitutional:  Negative for fatigue and fever.   HENT:  Negative for nasal congestion, rhinorrhea and sore throat.    Eyes:  Negative for pain.   Respiratory:  Negative for cough and shortness of breath.    Gastrointestinal:  Negative for anorexia, diarrhea and vomiting.   Musculoskeletal:  Negative for joint pain.   Integumentary:  Positive for nail changes and rash.       Objective:      Physical Exam    Assessment:       Problem List Items Addressed This Visit    None  Visit Diagnoses       Papular eczema    -  Primary    Relevant Medications    predniSONE (DELTASONE) 10 MG tablet    cephALEXin (KEFLEX) 500 MG capsule            Plan:       Diagnoses and all orders for this visit:    Papular eczema  -     predniSONE (DELTASONE) 10 MG tablet; Take 1 tablet (10 mg total) by mouth once daily.  -     cephALEXin (KEFLEX) 500 MG capsule; Take 1 capsule (500 mg total) by mouth every 12 (twelve) hours. for 7 days      Will try to clear cellulitis first then treat eczema. Keep skin moisturized with vaseline.

## 2023-01-18 ENCOUNTER — PATIENT MESSAGE (OUTPATIENT)
Dept: ADMINISTRATIVE | Facility: HOSPITAL | Age: 38
End: 2023-01-18
Payer: MEDICARE

## 2023-01-24 ENCOUNTER — PATIENT MESSAGE (OUTPATIENT)
Dept: ADMINISTRATIVE | Facility: HOSPITAL | Age: 38
End: 2023-01-24
Payer: MEDICARE

## 2023-02-09 DIAGNOSIS — Z00.00 ENCOUNTER FOR MEDICARE ANNUAL WELLNESS EXAM: ICD-10-CM

## 2023-03-02 ENCOUNTER — PATIENT MESSAGE (OUTPATIENT)
Dept: ADMINISTRATIVE | Facility: OTHER | Age: 38
End: 2023-03-02
Payer: MEDICARE

## 2023-04-06 ENCOUNTER — PATIENT OUTREACH (OUTPATIENT)
Dept: ADMINISTRATIVE | Facility: HOSPITAL | Age: 38
End: 2023-04-06
Payer: MEDICARE

## 2023-04-06 DIAGNOSIS — E11.9 DIABETES MELLITUS WITHOUT COMPLICATION: Primary | ICD-10-CM

## 2023-04-06 NOTE — PROGRESS NOTES
Attempted to contact pt in regards to overdue HM and to confirm that Dr. Malcolm is still her PCP. Unavailable left voicemail. Pt declines all eye exam screenings. Immunization's updated.

## 2023-04-12 ENCOUNTER — PATIENT MESSAGE (OUTPATIENT)
Dept: ADMINISTRATIVE | Facility: HOSPITAL | Age: 38
End: 2023-04-12
Payer: MEDICARE

## 2023-05-23 RX ORDER — ESTRADIOL 1 MG/1
1 TABLET ORAL DAILY
Qty: 30 TABLET | Refills: 1 | Status: SHIPPED | OUTPATIENT
Start: 2023-05-23 | End: 2023-06-16 | Stop reason: SDUPTHER

## 2023-06-14 ENCOUNTER — PATIENT OUTREACH (OUTPATIENT)
Dept: ADMINISTRATIVE | Facility: HOSPITAL | Age: 38
End: 2023-06-14
Payer: MEDICARE

## 2023-06-14 ENCOUNTER — OFFICE VISIT (OUTPATIENT)
Dept: FAMILY MEDICINE | Facility: CLINIC | Age: 38
End: 2023-06-14
Payer: MEDICARE

## 2023-06-14 ENCOUNTER — PATIENT MESSAGE (OUTPATIENT)
Dept: FAMILY MEDICINE | Facility: CLINIC | Age: 38
End: 2023-06-14

## 2023-06-14 DIAGNOSIS — E66.01 MORBID OBESITY: ICD-10-CM

## 2023-06-14 DIAGNOSIS — I10 HYPERTENSION, BENIGN: Primary | ICD-10-CM

## 2023-06-14 DIAGNOSIS — I47.10 PSVT (PAROXYSMAL SUPRAVENTRICULAR TACHYCARDIA): ICD-10-CM

## 2023-06-14 DIAGNOSIS — L30.9 DERMATITIS: ICD-10-CM

## 2023-06-14 PROBLEM — E11.9 DIABETES MELLITUS WITHOUT COMPLICATION: Status: RESOLVED | Noted: 2020-02-03 | Resolved: 2023-06-14

## 2023-06-14 PROCEDURE — 3066F NEPHROPATHY DOC TX: CPT | Mod: CPTII,95,, | Performed by: FAMILY MEDICINE

## 2023-06-14 PROCEDURE — 3061F NEG MICROALBUMINURIA REV: CPT | Mod: CPTII,95,, | Performed by: FAMILY MEDICINE

## 2023-06-14 PROCEDURE — 99214 OFFICE O/P EST MOD 30 MIN: CPT | Mod: 95,,, | Performed by: FAMILY MEDICINE

## 2023-06-14 PROCEDURE — 3044F PR MOST RECENT HEMOGLOBIN A1C LEVEL <7.0%: ICD-10-PCS | Mod: CPTII,95,, | Performed by: FAMILY MEDICINE

## 2023-06-14 PROCEDURE — 99214 PR OFFICE/OUTPT VISIT, EST, LEVL IV, 30-39 MIN: ICD-10-PCS | Mod: 95,,, | Performed by: FAMILY MEDICINE

## 2023-06-14 PROCEDURE — 3044F HG A1C LEVEL LT 7.0%: CPT | Mod: CPTII,95,, | Performed by: FAMILY MEDICINE

## 2023-06-14 PROCEDURE — 3061F PR NEG MICROALBUMINURIA RESULT DOCUMENTED/REVIEW: ICD-10-PCS | Mod: CPTII,95,, | Performed by: FAMILY MEDICINE

## 2023-06-14 PROCEDURE — 3066F PR DOCUMENTATION OF TREATMENT FOR NEPHROPATHY: ICD-10-PCS | Mod: CPTII,95,, | Performed by: FAMILY MEDICINE

## 2023-06-14 RX ORDER — AMLODIPINE BESYLATE 2.5 MG/1
2.5 TABLET ORAL DAILY
Qty: 30 TABLET | Refills: 2 | Status: SHIPPED | OUTPATIENT
Start: 2023-06-14 | End: 2023-09-21

## 2023-06-14 NOTE — PROGRESS NOTES
Answers submitted by the patient for this visit:  Diabetes Questionnaire (Submitted on 6/14/2023)  Chief Complaint: Diabetes problem  Diabetes type: type 2  MedicAlert ID: No  Disease duration: 2 Years  blurred vision: No  chest pain: No  fatigue: No  foot paresthesias: No  foot ulcerations: No  polydipsia: No  polyphagia: No  polyuria: No  visual change: No  weakness: No  weight loss: No  Symptom course: stable  confusion: No  dizziness: No  headaches: No  hunger: No  mood changes: No  nervous/anxious: No  pallor: No  seizures: No  sleepiness: No  speech difficulty: No  sweats: No  tremors: No  blackouts: No  hospitalization: No  nocturnal hypoglycemia: No  required assistance: No  required glucagon: No  CVA: No  heart disease: No  nephropathy: No  peripheral neuropathy: No  PVD: No  retinopathy: No  autonomic neuropathy: No  CAD risks: dyslipidemia, family history, hypertension  Current treatments: none  Exercise: weekly  Dietitian visit: No  Eye exam current: No  Sees podiatrist: No

## 2023-06-14 NOTE — PROGRESS NOTES
HER APPT IS VIRTUAL BUT SHE NEEDS TO DO OVERDUE LABS/URINE AND EYE.  Health Maintenance Due   Topic Date Due    COVID-19 Vaccine (1) Never done    Pneumococcal Vaccines (Age 0-64) (1 - PCV) Never done    Eye Exam  Never done    TETANUS VACCINE  04/23/2002    Foot Exam  11/06/2020    Hemoglobin A1c  09/29/2022    Diabetes Urine Screening  03/29/2023    Lipid Panel  03/29/2023

## 2023-06-16 ENCOUNTER — LAB VISIT (OUTPATIENT)
Dept: LAB | Facility: HOSPITAL | Age: 38
End: 2023-06-16
Attending: FAMILY MEDICINE
Payer: MEDICARE

## 2023-06-16 DIAGNOSIS — I10 HYPERTENSION, BENIGN: ICD-10-CM

## 2023-06-16 DIAGNOSIS — E78.2 MIXED DYSLIPIDEMIA: ICD-10-CM

## 2023-06-16 DIAGNOSIS — R21 RASH: ICD-10-CM

## 2023-06-16 DIAGNOSIS — E11.9 DIABETES MELLITUS WITHOUT COMPLICATION: ICD-10-CM

## 2023-06-16 LAB
ALBUMIN SERPL BCP-MCNC: 3.9 G/DL (ref 3.5–5.2)
ALP SERPL-CCNC: 86 U/L (ref 55–135)
ALT SERPL W/O P-5'-P-CCNC: 17 U/L (ref 10–44)
ANION GAP SERPL CALC-SCNC: 14 MMOL/L (ref 8–16)
AST SERPL-CCNC: 13 U/L (ref 10–40)
BASOPHILS # BLD AUTO: 0.1 K/UL (ref 0–0.2)
BASOPHILS NFR BLD: 0.7 % (ref 0–1.9)
BILIRUB SERPL-MCNC: 0.3 MG/DL (ref 0.1–1)
BUN SERPL-MCNC: 13 MG/DL (ref 6–20)
CALCIUM SERPL-MCNC: 9.9 MG/DL (ref 8.7–10.5)
CHLORIDE SERPL-SCNC: 101 MMOL/L (ref 95–110)
CHOLEST SERPL-MCNC: 282 MG/DL (ref 120–199)
CHOLEST/HDLC SERPL: 8.3 {RATIO} (ref 2–5)
CO2 SERPL-SCNC: 23 MMOL/L (ref 23–29)
CREAT SERPL-MCNC: 1 MG/DL (ref 0.5–1.4)
DIFFERENTIAL METHOD: ABNORMAL
EOSINOPHIL # BLD AUTO: 0.3 K/UL (ref 0–0.5)
EOSINOPHIL NFR BLD: 1.8 % (ref 0–8)
ERYTHROCYTE [DISTWIDTH] IN BLOOD BY AUTOMATED COUNT: 13.2 % (ref 11.5–14.5)
EST. GFR  (NO RACE VARIABLE): >60 ML/MIN/1.73 M^2
ESTIMATED AVG GLUCOSE: 117 MG/DL (ref 68–131)
GLUCOSE SERPL-MCNC: 98 MG/DL (ref 70–110)
HBA1C MFR BLD: 5.7 % (ref 4–5.6)
HCT VFR BLD AUTO: 38.5 % (ref 37–48.5)
HDLC SERPL-MCNC: 34 MG/DL (ref 40–75)
HDLC SERPL: 12.1 % (ref 20–50)
HGB BLD-MCNC: 12.7 G/DL (ref 12–16)
IMM GRANULOCYTES # BLD AUTO: 0.07 K/UL (ref 0–0.04)
IMM GRANULOCYTES NFR BLD AUTO: 0.5 % (ref 0–0.5)
LDLC SERPL CALC-MCNC: ABNORMAL MG/DL (ref 63–159)
LYMPHOCYTES # BLD AUTO: 4.6 K/UL (ref 1–4.8)
LYMPHOCYTES NFR BLD: 31.5 % (ref 18–48)
MCH RBC QN AUTO: 28.4 PG (ref 27–31)
MCHC RBC AUTO-ENTMCNC: 33 G/DL (ref 32–36)
MCV RBC AUTO: 86 FL (ref 82–98)
MONOCYTES # BLD AUTO: 0.8 K/UL (ref 0.3–1)
MONOCYTES NFR BLD: 5.4 % (ref 4–15)
NEUTROPHILS # BLD AUTO: 8.8 K/UL (ref 1.8–7.7)
NEUTROPHILS NFR BLD: 60.1 % (ref 38–73)
NONHDLC SERPL-MCNC: 248 MG/DL
NRBC BLD-RTO: 0 /100 WBC
PLATELET # BLD AUTO: 453 K/UL (ref 150–450)
PMV BLD AUTO: 10.2 FL (ref 9.2–12.9)
POTASSIUM SERPL-SCNC: 3.7 MMOL/L (ref 3.5–5.1)
PROT SERPL-MCNC: 8.2 G/DL (ref 6–8.4)
RBC # BLD AUTO: 4.47 M/UL (ref 4–5.4)
RHEUMATOID FACT SERPL-ACNC: <13 IU/ML (ref 0–15)
SODIUM SERPL-SCNC: 138 MMOL/L (ref 136–145)
T4 FREE SERPL-MCNC: 0.89 NG/DL (ref 0.71–1.51)
TRIGL SERPL-MCNC: 766 MG/DL (ref 30–150)
TSH SERPL DL<=0.005 MIU/L-ACNC: 4 UIU/ML (ref 0.4–4)
WBC # BLD AUTO: 14.68 K/UL (ref 3.9–12.7)

## 2023-06-16 PROCEDURE — 80053 COMPREHEN METABOLIC PANEL: CPT | Performed by: FAMILY MEDICINE

## 2023-06-16 PROCEDURE — 84443 ASSAY THYROID STIM HORMONE: CPT | Performed by: FAMILY MEDICINE

## 2023-06-16 PROCEDURE — 36415 COLL VENOUS BLD VENIPUNCTURE: CPT | Mod: PN | Performed by: FAMILY MEDICINE

## 2023-06-16 PROCEDURE — 83036 HEMOGLOBIN GLYCOSYLATED A1C: CPT | Performed by: FAMILY MEDICINE

## 2023-06-16 PROCEDURE — 86200 CCP ANTIBODY: CPT | Performed by: FAMILY MEDICINE

## 2023-06-16 PROCEDURE — 85025 COMPLETE CBC W/AUTO DIFF WBC: CPT | Performed by: FAMILY MEDICINE

## 2023-06-16 PROCEDURE — 86038 ANTINUCLEAR ANTIBODIES: CPT | Performed by: FAMILY MEDICINE

## 2023-06-16 PROCEDURE — 86431 RHEUMATOID FACTOR QUANT: CPT | Performed by: FAMILY MEDICINE

## 2023-06-16 PROCEDURE — 84439 ASSAY OF FREE THYROXINE: CPT | Performed by: FAMILY MEDICINE

## 2023-06-16 PROCEDURE — 80061 LIPID PANEL: CPT | Performed by: FAMILY MEDICINE

## 2023-06-17 LAB — CCP AB SER IA-ACNC: 0.7 U/ML

## 2023-06-17 RX ORDER — ESTRADIOL 1 MG/1
1 TABLET ORAL DAILY
Qty: 30 TABLET | Refills: 1 | Status: SHIPPED | OUTPATIENT
Start: 2023-06-17 | End: 2023-07-31 | Stop reason: SDUPTHER

## 2023-06-19 LAB — ANA SER QL IF: NORMAL

## 2023-06-21 DIAGNOSIS — I47.10 PSVT (PAROXYSMAL SUPRAVENTRICULAR TACHYCARDIA): ICD-10-CM

## 2023-06-21 DIAGNOSIS — I10 HYPERTENSION, BENIGN: ICD-10-CM

## 2023-06-21 NOTE — TELEPHONE ENCOUNTER
No care due was identified.  [facility] Embedded Care Due Messages. Reference number: [id number].   6/21/2023 4:59:51 PM CDT

## 2023-06-22 RX ORDER — METOPROLOL SUCCINATE 50 MG/1
50 TABLET, EXTENDED RELEASE ORAL DAILY
Qty: 90 TABLET | Refills: 1 | Status: SHIPPED | OUTPATIENT
Start: 2023-06-22

## 2023-06-22 RX ORDER — ESTRADIOL 1 MG/1
1 TABLET ORAL DAILY
Qty: 30 TABLET | Refills: 1 | OUTPATIENT
Start: 2023-06-22

## 2023-06-30 NOTE — PROGRESS NOTES
Subjective     Patient ID: Shadia Johnson is a 38 y.o. female.    Chief Complaint: No chief complaint on file.    Chief Complaint: No chief complaint on file.     The patient location is: [place]  The chief complaint leading to consultation is: weight loss exam.      Visit type: audiovisual     Face to Face time with patient:   10 minutes of total time spent on the encounter, which includes face to face time and non-face to face time preparing to see the patient (eg, review of tests), Obtaining and/or reviewing separately obtained history, Documenting clinical information in the electronic or other health record, Independently interpreting results (not separately reported) and communicating results to the patient/family/caregiver, or Care coordination (not separately reported).            Each patient to whom he or she provides medical services by telemedicine is:  (1) informed of the relationship between the physician and patient and the respective role of any other health care provider with respect to management of the patient; and (2) notified that he or she may decline to receive medical services by telemedicine and may withdraw from such care at any time.     Notes:   38 year old female presents for concerns about her blood pressure . Her blood pressure was elevated. She states it was 140 /90. Her HR was 87. She is on metoprolol. She is not controlled with this.      She still has the rash on her hands. She has been treated with doxycycline and triamcinolne. She states the rash never went away on her hand, but not on her stomach. The rash on her hand is painful.        Diabetes  She has type 2 diabetes mellitus. No MedicAlert identification noted. The initial diagnosis of diabetes was made 2 years ago. Pertinent negatives for hypoglycemia include no confusion, dizziness, headaches, hunger, mood changes, nervousness/anxiousness, pallor, seizures, sleepiness, speech difficulty, sweats or tremors. Pertinent  negatives for diabetes include no blurred vision, no chest pain, no fatigue, no foot paresthesias, no foot ulcerations, no polydipsia, no polyphagia, no polyuria, no visual change, no weakness and no weight loss. Pertinent negatives for hypoglycemia complications include no blackouts, no hospitalization, no nocturnal hypoglycemia, no required assistance and no required glucagon injection. Symptoms are stable. Pertinent negatives for diabetic complications include no autonomic neuropathy, CVA, heart disease, nephropathy, peripheral neuropathy, PVD or retinopathy. Risk factors for coronary artery disease include dyslipidemia, family history and hypertension. When asked about current treatments, none were reported. She has not had a previous visit with a dietitian. She participates in exercise weekly. She does not see a podiatrist.Eye exam is not current.   Review of Systems   Constitutional:  Negative for fatigue and weight loss.   Eyes:  Negative for blurred vision.   Cardiovascular:  Negative for chest pain.   Endocrine: Negative for polydipsia, polyphagia and polyuria.   Integumentary:  Negative for pallor.   Neurological:  Negative for dizziness, tremors, seizures, speech difficulty, weakness and headaches.   Psychiatric/Behavioral:  Negative for confusion. The patient is not nervous/anxious.         Objective     Physical Exam       Assessment and Plan     1. Hypertension, benign  -     amLODIPine (NORVASC) 2.5 MG tablet; Take 1 tablet (2.5 mg total) by mouth once daily.  Dispense: 30 tablet; Refill: 2  Stable. Refilled meds.       3. PSVT (paroxysmal supraventricular tachycardia)  Stable on his medications/ metoprolol    4. Dermatitis  Prednisone for eczema         Follow up in about 1 week (around 6/21/2023) for nurse BP check and with PCP in 1 month.

## 2023-07-07 ENCOUNTER — TELEPHONE (OUTPATIENT)
Dept: FAMILY MEDICINE | Facility: CLINIC | Age: 38
End: 2023-07-07
Payer: MEDICARE

## 2023-07-11 ENCOUNTER — OFFICE VISIT (OUTPATIENT)
Dept: FAMILY MEDICINE | Facility: CLINIC | Age: 38
End: 2023-07-11
Payer: MEDICARE

## 2023-07-11 ENCOUNTER — PATIENT MESSAGE (OUTPATIENT)
Dept: FAMILY MEDICINE | Facility: CLINIC | Age: 38
End: 2023-07-11

## 2023-07-11 DIAGNOSIS — E78.2 MIXED DYSLIPIDEMIA: Primary | Chronic | ICD-10-CM

## 2023-07-11 DIAGNOSIS — I10 HYPERTENSION, BENIGN: Chronic | ICD-10-CM

## 2023-07-11 DIAGNOSIS — R21 RASH: ICD-10-CM

## 2023-07-11 DIAGNOSIS — I47.10 PSVT (PAROXYSMAL SUPRAVENTRICULAR TACHYCARDIA): Chronic | ICD-10-CM

## 2023-07-11 PROCEDURE — 99214 OFFICE O/P EST MOD 30 MIN: CPT | Mod: HCNC,95,, | Performed by: FAMILY MEDICINE

## 2023-07-11 PROCEDURE — 3044F HG A1C LEVEL LT 7.0%: CPT | Mod: HCNC,CPTII,95, | Performed by: FAMILY MEDICINE

## 2023-07-11 PROCEDURE — 3066F NEPHROPATHY DOC TX: CPT | Mod: HCNC,CPTII,95, | Performed by: FAMILY MEDICINE

## 2023-07-11 PROCEDURE — 1160F RVW MEDS BY RX/DR IN RCRD: CPT | Mod: HCNC,CPTII,95, | Performed by: FAMILY MEDICINE

## 2023-07-11 PROCEDURE — 1159F PR MEDICATION LIST DOCUMENTED IN MEDICAL RECORD: ICD-10-PCS | Mod: HCNC,CPTII,95, | Performed by: FAMILY MEDICINE

## 2023-07-11 PROCEDURE — 3044F PR MOST RECENT HEMOGLOBIN A1C LEVEL <7.0%: ICD-10-PCS | Mod: HCNC,CPTII,95, | Performed by: FAMILY MEDICINE

## 2023-07-11 PROCEDURE — 1160F PR REVIEW ALL MEDS BY PRESCRIBER/CLIN PHARMACIST DOCUMENTED: ICD-10-PCS | Mod: HCNC,CPTII,95, | Performed by: FAMILY MEDICINE

## 2023-07-11 PROCEDURE — 3061F PR NEG MICROALBUMINURIA RESULT DOCUMENTED/REVIEW: ICD-10-PCS | Mod: HCNC,CPTII,95, | Performed by: FAMILY MEDICINE

## 2023-07-11 PROCEDURE — 1159F MED LIST DOCD IN RCRD: CPT | Mod: HCNC,CPTII,95, | Performed by: FAMILY MEDICINE

## 2023-07-11 PROCEDURE — 3061F NEG MICROALBUMINURIA REV: CPT | Mod: HCNC,CPTII,95, | Performed by: FAMILY MEDICINE

## 2023-07-11 PROCEDURE — 3066F PR DOCUMENTATION OF TREATMENT FOR NEPHROPATHY: ICD-10-PCS | Mod: HCNC,CPTII,95, | Performed by: FAMILY MEDICINE

## 2023-07-11 PROCEDURE — 99214 PR OFFICE/OUTPT VISIT, EST, LEVL IV, 30-39 MIN: ICD-10-PCS | Mod: HCNC,95,, | Performed by: FAMILY MEDICINE

## 2023-07-11 RX ORDER — BETAMETHASONE VALERATE 1.2 MG/G
CREAM TOPICAL 2 TIMES DAILY
Qty: 45 G | Refills: 0 | Status: SHIPPED | OUTPATIENT
Start: 2023-07-11

## 2023-07-11 NOTE — PROGRESS NOTES
Patient Name: Shadia Johnson    : 1985  MRN: [id number]    The patient location is: [place], [place], [place]  The chief complaint leading to consultation is: Abnormal Labs    Visit type: audiovisual    Face to Face time with patient: 13 minutes  15 minutes of total time spent on the encounter, which includes face to face time and non-face to face time preparing to see the patient (eg, review of tests), Obtaining and/or reviewing separately obtained history, Documenting clinical information in the electronic or other health record, Independently interpreting results (not separately reported) and communicating results to the patient/family/caregiver, or Care coordination (not separately reported).         Each patient to whom he or she provides medical services by telemedicine is:  (1) informed of the relationship between the physician and patient and the respective role of any other health care provider with respect to management of the patient; and (2) notified that he or she may decline to receive medical services by telemedicine and may withdraw from such care at any time.    Notes:     Subjective:     Patient ID: Shadia is a 38 y.o. female    Chief Complaint:  Abnormal Lab    38-year-old female with hypertension makes a virtual abnormal lab results her lab very elevated triglycerides.  She reports no changes to her diet.       Review of Systems   Constitutional:  Negative for activity change and unexpected weight change.   HENT:  Negative for hearing loss, rhinorrhea and trouble swallowing.    Eyes:  Negative for discharge and visual disturbance.   Respiratory:  Negative for chest tightness and wheezing.    Cardiovascular:  Negative for chest pain and palpitations.   Gastrointestinal:  Negative for blood in stool, constipation, diarrhea and vomiting.   Endocrine: Negative for polydipsia and polyuria.   Genitourinary:  Positive for difficulty urinating, dysuria and hematuria. Negative for menstrual problem.    Musculoskeletal:  Negative for arthralgias, joint swelling and neck pain.   Integumentary:  Positive for rash (on hands).   Neurological:  Positive for headaches. Negative for weakness.   Psychiatric/Behavioral:  Negative for confusion and dysphoric mood.       Objective:   LMP  (LMP Unknown)     Physical Exam  Pulmonary:      Effort: Pulmonary effort is normal.   Neurological:      Mental Status: She is alert.        Lab Visit on 06/16/2023   Component Date Value Ref Range Status    Microalbumin, Urine 06/16/2023 24.0  ug/mL Final    Creatinine, Urine 06/16/2023 207.0  15.0 - 325.0 mg/dL Final    Microalb/Creat Ratio 06/16/2023 11.6  0.0 - 30.0 ug/mg Final   Lab Visit on 06/16/2023   Component Date Value Ref Range Status    Sodium 06/16/2023 138  136 - 145 mmol/L Final    Potassium 06/16/2023 3.7  3.5 - 5.1 mmol/L Final    Chloride 06/16/2023 101  95 - 110 mmol/L Final    CO2 06/16/2023 23  23 - 29 mmol/L Final    Glucose 06/16/2023 98  70 - 110 mg/dL Final    BUN 06/16/2023 13  6 - 20 mg/dL Final    Creatinine 06/16/2023 1.0  0.5 - 1.4 mg/dL Final    Calcium 06/16/2023 9.9  8.7 - 10.5 mg/dL Final    Total Protein 06/16/2023 8.2  6.0 - 8.4 g/dL Final    Albumin 06/16/2023 3.9  3.5 - 5.2 g/dL Final    Total Bilirubin 06/16/2023 0.3  0.1 - 1.0 mg/dL Final    Comment: For infants and newborns, interpretation of results should be based  on gestational age, weight and in agreement with clinical  observations.    Premature Infant recommended reference ranges:  Up to 24 hours.............<8.0 mg/dL  Up to 48 hours............<12.0 mg/dL  3-5 days..................<15.0 mg/dL  6-29 days.................<15.0 mg/dL      Alkaline Phosphatase 06/16/2023 86  55 - 135 U/L Final    AST 06/16/2023 13  10 - 40 U/L Final    ALT 06/16/2023 17  10 - 44 U/L Final    Anion Gap 06/16/2023 14  8 - 16 mmol/L Final    eGFR 06/16/2023 >60  >60 mL/min/1.73 m^2 Final    WBC 06/16/2023 14.68 (H)  3.90 - 12.70 K/uL Final    RBC 06/16/2023  4.47  4.00 - 5.40 M/uL Final    Hemoglobin 06/16/2023 12.7  12.0 - 16.0 g/dL Final    Hematocrit 06/16/2023 38.5  37.0 - 48.5 % Final    MCV 06/16/2023 86  82 - 98 fL Final    MCH 06/16/2023 28.4  27.0 - 31.0 pg Final    MCHC 06/16/2023 33.0  32.0 - 36.0 g/dL Final    RDW 06/16/2023 13.2  11.5 - 14.5 % Final    Platelets 06/16/2023 453 (H)  150 - 450 K/uL Final    MPV 06/16/2023 10.2  9.2 - 12.9 fL Final    Immature Granulocytes 06/16/2023 0.5  0.0 - 0.5 % Final    Gran # (ANC) 06/16/2023 8.8 (H)  1.8 - 7.7 K/uL Final    Immature Grans (Abs) 06/16/2023 0.07 (H)  0.00 - 0.04 K/uL Final    Comment: Mild elevation in immature granulocytes is non specific and   can be seen in a variety of conditions including stress response,   acute inflammation, trauma and pregnancy. Correlation with other   laboratory and clinical findings is essential.      Lymph # 06/16/2023 4.6  1.0 - 4.8 K/uL Final    Mono # 06/16/2023 0.8  0.3 - 1.0 K/uL Final    Eos # 06/16/2023 0.3  0.0 - 0.5 K/uL Final    Baso # 06/16/2023 0.10  0.00 - 0.20 K/uL Final    nRBC 06/16/2023 0  0 /100 WBC Final    Gran % 06/16/2023 60.1  38.0 - 73.0 % Final    Lymph % 06/16/2023 31.5  18.0 - 48.0 % Final    Mono % 06/16/2023 5.4  4.0 - 15.0 % Final    Eosinophil % 06/16/2023 1.8  0.0 - 8.0 % Final    Basophil % 06/16/2023 0.7  0.0 - 1.9 % Final    Differential Method 06/16/2023 Automated   Final    TSH 06/16/2023 4.000  0.400 - 4.000 uIU/mL Final    NESTOR Screen 06/16/2023 Negative <1:80  Negative <1:80 Final    NESTOR test was performed by Immunofluorescence on HEP2 cells.       Rheumatoid Factor 06/16/2023 <13.0  0.0 - 15.0 IU/mL Final    CCP Antibodies 06/16/2023 0.7  <5.0 U/mL Final    Hemoglobin A1C 06/16/2023 5.7 (H)  4.0 - 5.6 % Final    Comment: ADA Screening Guidelines:  5.7-6.4%  Consistent with prediabetes  >or=6.5%  Consistent with diabetes    High levels of fetal hemoglobin interfere with the HbA1C  assay. Heterozygous hemoglobin variants (HbS, HgC,  etc)do  not significantly interfere with this assay.   However, presence of multiple variants may affect accuracy.      Estimated Avg Glucose 06/16/2023 117  68 - 131 mg/dL Final    Cholesterol 06/16/2023 282 (H)  120 - 199 mg/dL Final    Comment: The National Cholesterol Education Program (NCEP) has set the  following guidelines (reference ranges) for Cholesterol:  Optimal.....................<200 mg/dL  Borderline High.............200-239 mg/dL  High........................> or = 240 mg/dL      Triglycerides 06/16/2023 766 (H)  30 - 150 mg/dL Final    Comment: The National Cholesterol Education Program (NCEP) has set the  following guidelines (reference values) for triglycerides:  Normal......................<150 mg/dL  Borderline High.............150-199 mg/dL  High........................200-499 mg/dL      HDL 06/16/2023 34 (L)  40 - 75 mg/dL Final    Comment: The National Cholesterol Education Program (NCEP) has set the  following guidelines (reference values) for HDL Cholesterol:  Low...............<40 mg/dL  Optimal...........>60 mg/dL      LDL Cholesterol 06/16/2023 Invalid, Trig>400.0  63.0 - 159.0 mg/dL Final    Comment: The National Cholesterol Education Program (NCEP) has set the  following guidelines (reference values) for LDL Cholesterol:  Optimal.......................<130 mg/dL  Borderline High...............130-159 mg/dL  High..........................160-189 mg/dL  Very High.....................>190 mg/dL      HDL/Cholesterol Ratio 06/16/2023 12.1 (L)  20.0 - 50.0 % Final    Total Cholesterol/HDL Ratio 06/16/2023 8.3 (H)  2.0 - 5.0 Final    Non-HDL Cholesterol 06/16/2023 248  mg/dL Final    Comment: Risk category and Non-HDL cholesterol goals:  Coronary heart disease (CHD)or equivalent (10-year risk of CHD >20%):  Non-HDL cholesterol goal     <130 mg/dL  Two or more CHD risk factors and 10-year risk of CHD <= 20%:  Non-HDL cholesterol goal     <160 mg/dL  0 to 1 CHD risk factor:  Non-HDL  cholesterol goal     <190 mg/dL      Free T4 06/16/2023 0.89  0.71 - 1.51 ng/dL Final       Assessment        ICD-10-CM ICD-9-CM   1. Mixed dyslipidemia  E78.2 272.2   2. Hypertension, benign  I10 401.1   3. Rash  R21 782.1   4. PSVT (paroxysmal supraventricular tachycardia)  I47.1 427.0         Plan:     1. Mixed dyslipidemia  Overview:  Lab Results   Component Value Date    CHOL 282 (H) 06/16/2023    CHOL 268 (H) 03/29/2022    CHOL 222 (H) 02/03/2020     Lab Results   Component Value Date    HDL 34 (L) 06/16/2023    HDL 43 03/29/2022    HDL 32 (L) 02/03/2020     Lab Results   Component Value Date    LDLCALC Invalid, Trig>400.0 06/16/2023    LDLCALC 188.6 (H) 03/29/2022    LDLCALC 146.0 02/03/2020     Lab Results   Component Value Date    TRIG 766 (H) 06/16/2023    TRIG 182 (H) 03/29/2022    TRIG 220 (H) 02/03/2020     Lab Results   Component Value Date    CHOLHDL 12.1 (L) 06/16/2023    CHOLHDL 16.0 (L) 03/29/2022    CHOLHDL 14.4 (L) 02/03/2020        The ASCVD Risk score (Brian DK, et al., 2019) failed to calculate for the following reasons:    The 2019 ASCVD risk score is only valid for ages 40 to 79      Assessment & Plan:  Will recheck 10 hours fasting lipid panel given very elevated triglycerides compared to previous results.    Orders:  -     Lipid Panel; Future; Expected date: 07/11/2023    2. Hypertension, benign  Overview:  BP Readings from Last 3 Encounters:   08/02/22 (!) 158/110   03/29/22 132/88   04/21/21 130/86       Assessment & Plan:  Will schedule patient for blood pressure check as last values very elevated.      3. Rash  -     betamethasone valerate 0.1% (VALISONE) 0.1 % Crea; Apply topically 2 (two) times daily.  Dispense: 45 g; Refill: 0  Short course of topical betamethasone. If not resolve with, will refer to Dermatology    4. PSVT (paroxysmal supraventricular tachycardia)  Assessment & Plan:  Continue beta-blocker.             -Gerardo Malcolm Jr., MD, AAHIVS      This office note has been  dictated.  This dictation has been generated using M-Modal Fluency Direct dictation; some phonetic errors may occur.         There are no Patient Instructions on file for this visit.          No follow-ups on file.

## 2023-07-15 PROBLEM — I10 HYPERTENSION, BENIGN: Chronic | Status: ACTIVE | Noted: 2023-07-15

## 2023-07-15 NOTE — ASSESSMENT & PLAN NOTE
Will recheck 10 hours fasting lipid panel given very elevated triglycerides compared to previous results.

## 2023-07-19 ENCOUNTER — TELEPHONE (OUTPATIENT)
Dept: FAMILY MEDICINE | Facility: CLINIC | Age: 38
End: 2023-07-19
Payer: MEDICARE

## 2023-07-31 ENCOUNTER — OFFICE VISIT (OUTPATIENT)
Dept: OBSTETRICS AND GYNECOLOGY | Facility: CLINIC | Age: 38
End: 2023-07-31
Payer: MEDICARE

## 2023-07-31 VITALS
BODY MASS INDEX: 37.57 KG/M2 | DIASTOLIC BLOOD PRESSURE: 80 MMHG | SYSTOLIC BLOOD PRESSURE: 140 MMHG | WEIGHT: 225.75 LBS

## 2023-07-31 DIAGNOSIS — Z79.890 HORMONE REPLACEMENT THERAPY (HRT): ICD-10-CM

## 2023-07-31 DIAGNOSIS — Z01.419 WELL WOMAN EXAM WITH ROUTINE GYNECOLOGICAL EXAM: Primary | ICD-10-CM

## 2023-07-31 DIAGNOSIS — N95.1 VASOMOTOR SYMPTOMS DUE TO MENOPAUSE: ICD-10-CM

## 2023-07-31 DIAGNOSIS — N95.2 VAGINAL ATROPHY: ICD-10-CM

## 2023-07-31 PROCEDURE — 3077F PR MOST RECENT SYSTOLIC BLOOD PRESSURE >= 140 MM HG: ICD-10-PCS | Mod: HCNC,CPTII,S$GLB, | Performed by: OBSTETRICS & GYNECOLOGY

## 2023-07-31 PROCEDURE — 3061F PR NEG MICROALBUMINURIA RESULT DOCUMENTED/REVIEW: ICD-10-PCS | Mod: HCNC,CPTII,S$GLB, | Performed by: OBSTETRICS & GYNECOLOGY

## 2023-07-31 PROCEDURE — 1159F MED LIST DOCD IN RCRD: CPT | Mod: HCNC,CPTII,S$GLB, | Performed by: OBSTETRICS & GYNECOLOGY

## 2023-07-31 PROCEDURE — 3079F PR MOST RECENT DIASTOLIC BLOOD PRESSURE 80-89 MM HG: ICD-10-PCS | Mod: HCNC,CPTII,S$GLB, | Performed by: OBSTETRICS & GYNECOLOGY

## 2023-07-31 PROCEDURE — 3008F PR BODY MASS INDEX (BMI) DOCUMENTED: ICD-10-PCS | Mod: HCNC,CPTII,S$GLB, | Performed by: OBSTETRICS & GYNECOLOGY

## 2023-07-31 PROCEDURE — 3044F HG A1C LEVEL LT 7.0%: CPT | Mod: HCNC,CPTII,S$GLB, | Performed by: OBSTETRICS & GYNECOLOGY

## 2023-07-31 PROCEDURE — 3044F PR MOST RECENT HEMOGLOBIN A1C LEVEL <7.0%: ICD-10-PCS | Mod: HCNC,CPTII,S$GLB, | Performed by: OBSTETRICS & GYNECOLOGY

## 2023-07-31 PROCEDURE — 3066F NEPHROPATHY DOC TX: CPT | Mod: HCNC,CPTII,S$GLB, | Performed by: OBSTETRICS & GYNECOLOGY

## 2023-07-31 PROCEDURE — 99999 PR PBB SHADOW E&M-EST. PATIENT-LVL II: CPT | Mod: PBBFAC,HCNC,, | Performed by: OBSTETRICS & GYNECOLOGY

## 2023-07-31 PROCEDURE — 3008F BODY MASS INDEX DOCD: CPT | Mod: HCNC,CPTII,S$GLB, | Performed by: OBSTETRICS & GYNECOLOGY

## 2023-07-31 PROCEDURE — G0101 CA SCREEN;PELVIC/BREAST EXAM: HCPCS | Mod: HCNC,GZ,S$GLB, | Performed by: OBSTETRICS & GYNECOLOGY

## 2023-07-31 PROCEDURE — 3061F NEG MICROALBUMINURIA REV: CPT | Mod: HCNC,CPTII,S$GLB, | Performed by: OBSTETRICS & GYNECOLOGY

## 2023-07-31 PROCEDURE — 1159F PR MEDICATION LIST DOCUMENTED IN MEDICAL RECORD: ICD-10-PCS | Mod: HCNC,CPTII,S$GLB, | Performed by: OBSTETRICS & GYNECOLOGY

## 2023-07-31 PROCEDURE — 3077F SYST BP >= 140 MM HG: CPT | Mod: HCNC,CPTII,S$GLB, | Performed by: OBSTETRICS & GYNECOLOGY

## 2023-07-31 PROCEDURE — 99999 PR PBB SHADOW E&M-EST. PATIENT-LVL II: ICD-10-PCS | Mod: PBBFAC,HCNC,, | Performed by: OBSTETRICS & GYNECOLOGY

## 2023-07-31 PROCEDURE — 3066F PR DOCUMENTATION OF TREATMENT FOR NEPHROPATHY: ICD-10-PCS | Mod: HCNC,CPTII,S$GLB, | Performed by: OBSTETRICS & GYNECOLOGY

## 2023-07-31 PROCEDURE — 3079F DIAST BP 80-89 MM HG: CPT | Mod: HCNC,CPTII,S$GLB, | Performed by: OBSTETRICS & GYNECOLOGY

## 2023-07-31 PROCEDURE — G0101 PR CA SCREEN;PELVIC/BREAST EXAM: ICD-10-PCS | Mod: HCNC,GZ,S$GLB, | Performed by: OBSTETRICS & GYNECOLOGY

## 2023-07-31 RX ORDER — ESTRADIOL 1 MG/1
1 TABLET ORAL DAILY
Qty: 30 TABLET | Refills: 12 | Status: SHIPPED | OUTPATIENT
Start: 2023-07-31

## 2023-07-31 NOTE — PROGRESS NOTES
SUBJECTIVE:   Shadia Johnson is a 38 y.o. female   for annual well woman exam. No LMP recorded (lmp unknown). Patient has had a hysterectomy..  She has no unusual complaints.      S/p supracervical hyst @ age 24 for bleeding after delivery, then cervical removal with BSO for endometriosis - stage 4  On HRT since then - currently on estradiol 1 mg  And using vaginal estring for vaginal atrophy    Past Medical History:   Diagnosis Date    Anemia     Anxiety     Asthma     Childhood    Depression     Endometriosis of pelvis     Endometriosis, severe     Stage 4    History of endometriosis     Hypertension     Interstitial cystitis     Ulcer     hunners     Past Surgical History:   Procedure Laterality Date    COLONOSCOPY      HYSTERECTOMY      endometriosis    interSTIM IMPLANT      LAPOROSCOPY      left salpingectomy      MAMMOGRAM  2010    TRACHELECTOMY  11     Social History     Socioeconomic History    Marital status: Other   Tobacco Use    Smoking status: Never    Smokeless tobacco: Never    Tobacco comments:     , .  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  Disabled, SSDI.  Kids: 9 & 2.   Substance and Sexual Activity    Alcohol use: No    Drug use: No    Sexual activity: Yes     Partners: Male     Birth control/protection: Surgical     Comment: : [place]  ( at hotel).    Social History Narrative    .  Occup:  Disabled.       Social Determinants of Health     Financial Resource Strain: Low Risk  (2023)    Overall Financial Resource Strain (CARDIA)     Difficulty of Paying Living Expenses: Not hard at all   Food Insecurity: No Food Insecurity (2023)    Hunger Vital Sign     Worried About Running Out of Food in the Last Year: Never true     Ran Out of Food in the Last Year: Never true   Transportation Needs: No Transportation Needs (2023)    PRAPARE - Transportation     Lack of Transportation (Medical): No     Lack of Transportation (Non-Medical): No    Physical Activity: Sufficiently Active (2023)    Exercise Vital Sign     Days of Exercise per Week: 3 days     Minutes of Exercise per Session: 60 min   Recent Concern: Physical Activity - Insufficiently Active (2023)    Exercise Vital Sign     Days of Exercise per Week: 2 days     Minutes of Exercise per Session: 60 min   Stress: Stress Concern Present (2023)    [place] [employer] - Occupational Stress Questionnaire     Feeling of Stress : To some extent   Social Connections: Unknown (2023)    Social Connection and Isolation Panel [NHANES]     Frequency of Communication with Friends and Family: More than three times a week     Frequency of Social Gatherings with Friends and Family: More than three times a week     Active Member of Clubs or Organizations: No     Attends Club or Organization Meetings: Patient refused     Marital Status: Living with partner   Housing Stability: Low Risk  (2023)    Housing Stability Vital Sign     Unable to Pay for Housing in the Last Year: No     Number of Places Lived in the Last Year: 1     Unstable Housing in the Last Year: No     Family History   Problem Relation Age of Onset    Alcohol abuse Mother     Cancer Maternal Grandmother         skin    Depression Maternal Grandmother     Heart disease Paternal Uncle     Hyperlipidemia Paternal Uncle      OB History    Para Term  AB Living   2 2       2   SAB IAB Ectopic Multiple Live Births                  # Outcome Date GA Lbr Erasmo/2nd Weight Sex Delivery Anes PTL Lv   2 Para      Vag-Spont      1 Para      Vag-Spont         Obstetric Comments   S/p supracervical hyst @ age 24 for bleeding after delivery, then cervical removal with BSO for endometriosis - sstage 4   On HRT since then   H/o right lumpectomy - benign per pt   Denies abnl pap         Current Outpatient Medications   Medication Sig Dispense Refill    amLODIPine (NORVASC) 2.5 MG tablet Take 1 tablet (2.5 mg  total) by mouth once daily. 30 tablet 2    betamethasone valerate 0.1% (VALISONE) 0.1 % Crea Apply topically 2 (two) times daily. 45 g 0    estradioL (ESTRACE) 1 MG tablet Take 1 tablet (1 mg total) by mouth once daily. 30 tablet 1    estradioL (ESTRING) 2 mg (7.5 mcg /24 hour) vaginal ring Place 2 mg vaginally every 3 (three) months. 1 each 4    metoprolol succinate (TOPROL-XL) 50 MG 24 hr tablet Take 1 tablet (50 mg total) by mouth once daily. 90 tablet 1    triamcinolone acetonide 0.1% (KENALOG) 0.1 % ointment Apply topically 2 (two) times daily. To rash area for 10 days 30 g 0     No current facility-administered medications for this visit.     Allergies: Alcohol, unspecified       ROS:  GENERAL: Denies weight gain or weight loss. Feeling well overall.   SKIN: Denies rash or lesions.   HEAD: Denies head injury or headache.   NODES: Denies enlarged lymph nodes.   CHEST: Denies chest pain or shortness of breath.   CARDIOVASCULAR: Denies palpitations or left sided chest pain.   ABDOMEN: No abdominal pain, constipation, diarrhea, nausea, vomiting or rectal bleeding.   URINARY: No frequency, dysuria, hematuria, or burning on urination.  REPRODUCTIVE: Denies vaginal discharge, abnormal vaginal bleeding, lesions, pelvic pain  BREASTS: The patient performs breast self-examination and denies pain, lumps, or nipple discharge.   HEMATOLOGIC: No easy bruisability or excessive bleeding.  MUSCULOSKELETAL: Denies joint pain or swelling.   NEUROLOGIC: Denies syncope or weakness.   PSYCHIATRIC: Denies depression, anxiety or mood swings.      OBJECTIVE:   BP (!) 140/80   Wt 102.4 kg (225 lb 12 oz)   LMP  (LMP Unknown)   BMI 37.57 kg/m²   The patient appears well, alert, oriented x 3, in no distress.  PSYCH:  Normal, full range of affect  NECK: negative, no thyromegaly, trachea midline  SKIN: normal, good color, good turgor and no acne, striae, hirsutism  BREAST EXAM: breasts appear normal, no suspicious masses, no skin or  nipple changes or axillary nodes  ABDOMEN: soft, non-tender; bowel sounds normal; no masses,  no organomegaly and no hernias, masses, or hepatosplenomegaly  GENITALIA: normal external genitalia, no erythema, no discharge  BLADDER: soft  URETHRA: normal appearing urethra with no masses, tenderness or lesions and normal urethra, normal urethral meatus  VAGINA: Normal, Estring in place  CERVIX: absent  UTERUS: uterus absent  ADNEXA: no mass, fullness, tenderness      ASSESSMENT:   1. Health maintenance  -pap not indicated  -counseled on exercise and healthy diet, weight loss  -bone health:  Discussed Vitamin D and Calcium supplementation, weight bearing exercises  2.  Discussed safety at home/school/work, healthy and balanced diet, sleep hygiene, as well as violence/weapons exposure.   3.  Refilled on Estring and estradiol tablets

## 2023-09-20 DIAGNOSIS — I10 HYPERTENSION, BENIGN: ICD-10-CM

## 2023-09-20 NOTE — TELEPHONE ENCOUNTER
Refill Routing Note   Medication(s) are not appropriate for processing by [facility] for the following reason(s):      Required vitals abnormal: BP (!) 140/80    [facility] action(s):  Defer Care Due:  None identified   Medication Therapy Plan:         Appointments  past 12m or future 3m with PCP    Date Provider   Last Visit   6/14/2023 Aleshia Levine MD   Next Visit   Visit date not found Aleshia Levine MD   ED visits in past 90 days: 0        Note composed:3:43 PM 09/20/2023

## 2023-09-20 NOTE — TELEPHONE ENCOUNTER
No care due was identified.  [facility] Embedded Care Due Messages. Reference number: [id number].   9/20/2023 12:48:41 PM CDT

## 2023-09-21 RX ORDER — AMLODIPINE BESYLATE 2.5 MG/1
2.5 TABLET ORAL DAILY
Qty: 30 TABLET | Refills: 0 | Status: SHIPPED | OUTPATIENT
Start: 2023-09-21 | End: 2023-10-21

## 2023-09-21 NOTE — TELEPHONE ENCOUNTER
Patient no-showed to her blood pressure check. I will send 14 days of medication. Needs to schedule nurse visit BP check. No further fills after that unless seen if misses BP check

## 2023-09-22 NOTE — TELEPHONE ENCOUNTER
Left message a 14 day supply was sent to the pharmacy please reschedule appt with nurse that was missed before supply is gone.

## 2023-10-20 ENCOUNTER — TELEPHONE (OUTPATIENT)
Dept: FAMILY MEDICINE | Facility: CLINIC | Age: 38
End: 2023-10-20
Payer: MEDICARE

## 2023-10-20 NOTE — TELEPHONE ENCOUNTER
Spoke with patient. To reschedule office visit on 11/7/23 as provider will be out of the office. Pt stated being In [place] and will call at later date for a new appt. Arthur

## 2023-10-24 ENCOUNTER — PATIENT OUTREACH (OUTPATIENT)
Dept: ADMINISTRATIVE | Facility: HOSPITAL | Age: 38
End: 2023-10-24
Payer: MEDICARE

## 2023-10-24 NOTE — PROGRESS NOTES
Reached out to pt in regards to overdue HM unavailable left voicemail. Immunization's updated/triggered. Immunization's updated/triggered.

## 2023-11-02 ENCOUNTER — PATIENT OUTREACH (OUTPATIENT)
Dept: ADMINISTRATIVE | Facility: HOSPITAL | Age: 38
End: 2023-11-02
Payer: MEDICARE

## 2023-11-06 ENCOUNTER — PATIENT MESSAGE (OUTPATIENT)
Dept: ADMINISTRATIVE | Facility: HOSPITAL | Age: 38
End: 2023-11-06
Payer: MEDICARE

## 2024-03-01 ENCOUNTER — PATIENT MESSAGE (OUTPATIENT)
Dept: ADMINISTRATIVE | Facility: HOSPITAL | Age: 39
End: 2024-03-01
Payer: MEDICARE

## 2024-05-17 ENCOUNTER — PATIENT OUTREACH (OUTPATIENT)
Dept: ADMINISTRATIVE | Facility: HOSPITAL | Age: 39
End: 2024-05-17
Payer: MEDICARE

## 2024-05-17 NOTE — PROGRESS NOTES
LVM for pt to call back and schedule a nurse BP visit, visit with PCP or give a home BP reading.Gap report updated.

## 2024-06-04 ENCOUNTER — PATIENT MESSAGE (OUTPATIENT)
Dept: ADMINISTRATIVE | Facility: HOSPITAL | Age: 39
End: 2024-06-04
Payer: MEDICARE

## 2024-06-19 DIAGNOSIS — I10 HYPERTENSION, BENIGN: ICD-10-CM

## 2024-07-04 ENCOUNTER — PATIENT MESSAGE (OUTPATIENT)
Dept: OBSTETRICS AND GYNECOLOGY | Facility: CLINIC | Age: 39
End: 2024-07-04
Payer: MEDICARE

## 2024-07-04 ENCOUNTER — ON-DEMAND VIRTUAL (OUTPATIENT)
Dept: URGENT CARE | Facility: CLINIC | Age: 39
End: 2024-07-04
Payer: MEDICARE

## 2024-07-04 DIAGNOSIS — G47.00 INSOMNIA, UNSPECIFIED TYPE: ICD-10-CM

## 2024-07-04 DIAGNOSIS — F41.9 ANXIETY: Primary | ICD-10-CM

## 2024-07-04 RX ORDER — HYDROXYZINE PAMOATE 50 MG/1
50 CAPSULE ORAL EVERY 6 HOURS PRN
Qty: 40 CAPSULE | Refills: 0 | Status: SHIPPED | OUTPATIENT
Start: 2024-07-04 | End: 2024-07-14

## 2024-07-04 NOTE — PATIENT INSTRUCTIONS
Patient Education       Anxiety Discharge Instructions, Adult   About this topic   Anxiety can cause you to feel very worried. It can also cause physical symptoms like chest pain, stomach aches, or trouble sleeping. While mild anxiety is a normal response to stress, it can cause you problems in your everyday life. You may need follow-up care to help manage your anxiety. Anxiety happens in many forms, like:  Being scared all the time that something bad is going to happen. This is generalized anxiety.  Strong bursts of fear where your body has signs that may feel like a heart attack. This is called a panic attack.  Upsetting thoughts that happen often. There is a need to repeat doing certain things to help get rid of the anxiety caused by these thoughts. The thoughts or actions may be about checking on things, touching things, or worry about germs. This is an obsessive-compulsive disorder.  Strong fear of an object, place, or condition. This is a phobia.  Fear that others think bad things about you or being put down by other people. This is social anxiety.  Nightmares, flashbacks, staying away from people, or having panic attacks when reminded of a shocking or hurtful time or place from the past. This is post-traumatic stress.  Anxiety disorder may be treated in many ways. Some kinds of treatment have you talk about your beliefs, fears, and worries. You may learn how certain thoughts or feelings can raise anxiety. You may also learn what steps to take to lower anxiety. Other kinds of treatment may have you look back on a hurtful event, sad memory, or something you are afraid of. The doctor will help you deal with the feelings that you may have. You may learn ways to cope with unwanted events or thoughts by looking at your fears in a way that feels safe.  What care is needed at home?   Ask your doctor what you need to do when you go home. Make sure you ask questions if you do not understand what the doctor says.  Set a  time to talk with a counselor about your worries and feelings. This can help you with your anxiety.  Take care to follow all instructions when you take your medicines.  Limit alcohol and caffeine.  Learn ways to manage stress. Relaxation methods like reflection, deep breathing, and muscle relaxation may be helpful. Things like yoga, exercise, and jose chi are also good.  Talk about your feelings with family members and friends you trust. Talk to someone who can help you see how your thoughts at certain times may raise your anxiety.     What follow-up care is needed?   Your doctor may ask you to make visits to the office to check on your progress. Be sure to keep these visits.  What drugs may be needed?   The doctor may order drugs to help the physical signs of anxiety. Make sure that you take the drugs as taught to you by the doctor. Talk with your doctor about any side effects and ask how long you should take the drug.  Will physical activity be limited?   You may take part in physical activities. Some people are limited because of their anxiety or fear. Talk with your doctor about the right amount of activity for you.  What changes to diet are needed?   Eat a variety of healthy foods and limit drinks with caffeine. You should avoid alcohol, energy drinks, and over-the-counter stimulants.  What problems could happen?   If your anxiety is not treated, it can result in:  Staying away from work or social events  Not being able to do everyday tasks  Keeping away from family and friends  What can be done to prevent this health problem?   Learn what events, people, or things upset you. Limit your contact with them.  Talk about your feelings. Talk to someone who can help you see how your thoughts at certain times may raise your anxiety.  Seek support from your friends and family. Find someone who calms you down. Ask if you can call them when you are getting anxious.  When do I need to call the doctor?   You feel you may  harm yourself or someone else.  You can also call a mental health hotline for help.  You have any physical symptoms, such as chest pain, trouble breathing, or severe belly pain, that could be a sign of a serious problem.  If you are short of breath.  If you do not feel like you can be alone.  Teach Back: Helping You Understand   The Teach Back Method helps you understand the information we are giving you. After you talk with the staff, tell them in your own words what you learned. This helps to make sure the staff has described each thing clearly. It also helps to explain things that may have been confusing. Before going home, make sure you can do these:  I can tell you about my condition and the drugs I need to take.  I can tell you what may help lower my anxiety.  I can tell you what I will do if it is hard to breathe or I have chest pain.  I can tell you what I will do if I do not feel safe or cannot be alone.  Where can I learn more?   BILL  https://www.bill.org/Learn-More/Mental-Health-Conditions/Anxiety-Disorders   National Health Service  https://www.nhs.uk/conditions/generalised-anxiety-disorder/symptoms/   [employer] [place] [facility] ? [facility]  https://www.meek.nih.gov/health/relieving-stress-anxiety-cxofsxfma-ftxmlkmzol-vdonxjcjhi   [employer] [place] [facility]  http://www.nimh.nih.gov/health/publications/anxiety-disorders/complete-index.shtml   Last Reviewed Date   2021-06-08  Consumer Information Use and Disclaimer   This information is not specific medical advice and does not replace information you receive from your health care provider. This is only a brief summary of general information. It does NOT include all information about conditions, illnesses, injuries, tests, procedures, treatments, therapies, discharge instructions or life-style choices that may apply to you. You must talk with your health care provider for complete information about your health and treatment options. This  information should not be used to decide whether or not to accept your health care providers advice, instructions or recommendations. Only your health care provider has the knowledge and training to provide advice that is right for you.  Copyright   Copyright © 2021 [employer] and its affiliates and/or licensors. All rights reserved.      Patient Education       Insomnia Discharge Instructions   About this topic   Insomnia is a common sleep problem. You have trouble falling asleep or staying asleep when you have this problem. You may wake up early and not be able to fall back to sleep. Sometimes, you may wake up in the morning and not feel rested. Then, you may feel tired or sleepy all day. You may be grouchy, forget things, or have trouble focusing. It may impact your work, school, and relationships. You may not feel physically well.  Insomnia can last for a few days, weeks, or even months. For some people, insomnia may happen every day. For others it will happen a few times a week. Insomnia may be caused by stress or another health problem. It may also occur with a mental disorder. Doctors will work to find and treat the cause of your insomnia. Most often, this includes lifestyle changes.     What care is needed at home?   Ask your doctor what you need to do when you go home. Make sure you ask questions if you do not understand what the doctor says. This way you will know what you need to do.  Your doctor may ask you to keep a log of your daily sleep habits. Write down:  How long you sleep each night.  When you go to bed and when you wake up.  How long it takes to fall asleep  How often you have trouble falling or staying asleep  If you snore  If you have trouble staying awake during the day  Your daily activities and routine  What you ate or drank close to bedtime  What drugs you took in the evening and at what time  Whether or not you took a nap during the day  What follow-up care is needed?   Your doctor  may ask you to make visits to the office to check on your progress. Be sure to keep these visits. Talk with your doctor about any other drugs you are taking. Some of these drugs may cause insomnia.  Your doctor may send you to a sleep center for special sleep tests.  Your doctor may suggest you talk with a counselor. If you are stressed, talking with someone may help to lessen stress and help with your insomnia.  What lifestyle changes are needed?   Avoid drinking or eating things with caffeine late in the day and before bedtime.  Do not eat full meals close to bedtime.  Do not go to bed hungry. Try a light snack before bedtime.  Avoid drinking beer, wine, and mixed drinks (alcohol) before bedtime.  If you smoke, stop smoking.  Avoid taking long naps during the day. This can interfere with your sleep at night.  What drugs may be needed?   The doctor may order drugs to:  Help you sleep  Help with low mood or anxiety  Help with any physical problems  Will physical activity be limited?   Unless your doctor tells you to change your activity level, stay active. Avoid working out right before bedtime.  What problems could happen?   Poor performance at work or school  Weight problems  Anxiety or low mood  Accidents related to sleepiness or trouble focusing  Slow reaction time  What can be done to prevent this health problem?   Keep the same sleep schedule. Go to bed and get up at the same time each day.  Avoid taking naps during the day.  Sleep in a dark and quiet room at a comfortable temperature.  Before you go to bed, make a list of things you need to do the next day. This may keep you from worrying about them.  Prepare and get things ready for the morning before you go to bed.  Try reading a book or listening to calming music before falling asleep.  A warm bath may help to relax you.  Use a fan or white noise machine to help block out noise.  If you cannot fall asleep, get up and do some light activity until you feel  sleepy.  Find ways to manage stress. Use relaxation methods like deep breathing and muscle relaxation. Things like yoga or jose chi may also be helpful.  Avoid working on the computer, looking at your phone or tablet, or watching TV right before bed.  Avoid using your bedroom for things other than sleeping or sex.  When do I need to call the doctor?   Lack of sleep keeps you from your daily activities  You think your insomnia might be caused by some other health problem  You are not feeling better in 2 to 3 days or you are feeling worse  Teach Back: Helping You Understand   The Teach Back Method helps you understand the information we are giving you. After you talk with the staff, tell them in your own words what you learned. This helps to make sure the staff has described each thing clearly. It also helps to explain things that may have been confusing. Before going home, make sure you can do these:  I can tell you about my condition.  I can tell you what changes to my lifestyle may help me sleep better.  I can tell you what may help prevent insomnia.  Where can I learn more?   [employer]  https://familydoctor.org/condition/insomnia/   [facility] Choices  https://www.nhs.uk/conditions/insomnia/   Last Reviewed Date   2020-12-14  Consumer Information Use and Disclaimer   This information is not specific medical advice and does not replace information you receive from your health care provider. This is only a brief summary of general information. It does NOT include all information about conditions, illnesses, injuries, tests, procedures, treatments, therapies, discharge instructions or life-style choices that may apply to you. You must talk with your health care provider for complete information about your health and treatment options. This information should not be used to decide whether or not to accept your health care providers advice, instructions or recommendations. Only your health care  provider has the knowledge and training to provide advice that is right for you.  Copyright   Copyright © 2021 [employer] and its affiliates and/or licensors. All rights reserved.

## 2024-07-04 NOTE — PROGRESS NOTES
Subjective:      Patient ID: Shadia Johnson is a 39 y.o. female.    Vitals:  vitals were not taken for this visit.     Chief Complaint: Anxiety, Depression, and Insomnia      Visit Type: TELE AUDIOVISUAL    Present with the patient at the time of consultation: TELEMED PRESENT WITH PATIENT: None, at home    Past Medical History:   Diagnosis Date    Anemia     Anxiety     Asthma     Childhood    Depression     Endometriosis of pelvis     Endometriosis, severe     Stage 4    History of endometriosis     Hypertension     Interstitial cystitis     Ulcer     hunners     Past Surgical History:   Procedure Laterality Date    COLONOSCOPY  2011    HYSTERECTOMY  2010    endometriosis    interSTIM IMPLANT  2011    LAPOROSCOPY  2012    left salpingectomy      MAMMOGRAM  2010    TRACHELECTOMY  12     Review of patient's allergies indicates:   Allergen Reactions    Alcohol, unspecified Anxiety, Hives, Itching and Swelling     Current Outpatient Medications on File Prior to Visit   Medication Sig Dispense Refill    amLODIPine (NORVASC) 2.5 MG tablet Take 1 tablet (2.5 mg total) by mouth once daily. 30 tablet 0    betamethasone valerate 0.1% (VALISONE) 0.1 % Crea Apply topically 2 (two) times daily. 45 g 0    estradioL (ESTRACE) 1 MG tablet Take 1 tablet (1 mg total) by mouth once daily. 30 tablet 12    estradioL (ESTRING) 2 mg (7.5 mcg /24 hour) vaginal ring Place 2 mg vaginally every 3 (three) months. 1 each 3    metoprolol succinate (TOPROL-XL) 50 MG 24 hr tablet Take 1 tablet (50 mg total) by mouth once daily. 90 tablet 1    triamcinolone acetonide 0.1% (KENALOG) 0.1 % ointment Apply topically 2 (two) times daily. To rash area for 10 days 30 g 0     No current facility-administered medications on file prior to visit.     Family History   Problem Relation Name Age of Onset    Alcohol abuse Mother BUDDY     Cancer Maternal Grandmother TIFFANIE         skin    Depression Maternal Grandmother TIFFANIE     Heart disease Paternal Uncle       Hyperlipidemia Paternal Uncle         Medications Ordered                [id number] [place] - [address] AT [facility] [address]   [address] [place] [address]    Telephone: [phone]   Fax: [phone]   Hours: Not open 24 hours                         E-Prescribed (1 of 1)              hydrOXYzine pamoate (VISTARIL) 50 MG Cap    Sig: Take 1 capsule (50 mg total) by mouth every 6 (six) hours as needed (insomnia). 1-2 capsules as needed. Take 30-60 minutes before bedtime.       Start: 7/4/24     Quantity: 40 capsule Refills: 0                           Ohs Peq Odvv Intake    7/4/2024  4:50 PM CDT - Filed by Patient   What is your current physical address in the event of a medical emergency? [address] [place] [id number]   Are you able to take your vital signs? No   Please attach any relevant images or files          Woke up with overwhelming feeling of anxiety. Hx of depression and insomnia. Decreased sleep. Shaky sensation, intermittent SOB, increased and feels her heart beat. No other associated symptoms to report. Has taken hydroxyzine in the past. No acute psychosis concerns. Currently at her parent's house.    Anxiety  Symptoms include nervous/anxious behavior. Patient reports no confusion or suicidal ideas.       DepressionPatient presents with the following symptoms: nervousness/anxiety.  Patient is not experiencing: confusion and suicidal ideas.    No history of: substance abuse        Neurological:  Negative for disorientation and altered mental status.   Psychiatric/Behavioral:  Positive for nervous/anxious, sleep disturbance and history of mental illness. Negative for altered mental status, disorientation, confusion, agitation, hallucinations, homicidal ideas, suicidal ideas, self-injury, substance abuse and depression. The patient is nervous/anxious.         Objective:   The physical exam was conducted virtually.  Physical Exam    Constitutional: She is oriented to person, place, and time. She does not appear ill. No distress.   HENT:   Head: Normocephalic and atraumatic.   Nose: Nose normal.   Eyes: Extraocular movement intact   Pulmonary/Chest: Effort normal.   Abdominal: Normal appearance.   Musculoskeletal: Normal range of motion.         General: Normal range of motion.   Neurological: no focal deficit. She is alert and oriented to person, place, and time.   Psychiatric: She experiences Normal attention and Normal perception. Her speech is normal and behavior is normal. Mood, memory, affect and thought content normal. Cognition normal  Vitals reviewed.      Assessment:     1. Anxiety    2. Insomnia, unspecified type        Plan:   Follow-up as discussed.    Call 911 or go to the ER for immediate help and worsening symptoms.    Patient encouraged to monitor symptoms closely and instructed to follow-up for new or worsening symptoms. Further, in-person, evaluation may be necessary for continued treatment. Please follow up with your primary care doctor or specialist as needed. Verbally discussed plan. Patient confirms understanding and is in agreement with treatment and plan.     You must understand that you've received a Virtual Care evaluation only and that you may be released before all your medical problems are known or treated. You, the patient, will arrange for follow up care as instructed.      Anxiety  -     hydrOXYzine pamoate (VISTARIL) 50 MG Cap; Take 1 capsule (50 mg total) by mouth every 6 (six) hours as needed (insomnia). 1-2 capsules as needed. Take 30-60 minutes before bedtime.  Dispense: 40 capsule; Refill: 0    Insomnia, unspecified type  -     hydrOXYzine pamoate (VISTARIL) 50 MG Cap; Take 1 capsule (50 mg total) by mouth every 6 (six) hours as needed (insomnia). 1-2 capsules as needed. Take 30-60 minutes before bedtime.  Dispense: 40 capsule; Refill: 0        Patient Instructions   Patient Education       Anxiety  Discharge Instructions, Adult   About this topic   Anxiety can cause you to feel very worried. It can also cause physical symptoms like chest pain, stomach aches, or trouble sleeping. While mild anxiety is a normal response to stress, it can cause you problems in your everyday life. You may need follow-up care to help manage your anxiety. Anxiety happens in many forms, like:  Being scared all the time that something bad is going to happen. This is generalized anxiety.  Strong bursts of fear where your body has signs that may feel like a heart attack. This is called a panic attack.  Upsetting thoughts that happen often. There is a need to repeat doing certain things to help get rid of the anxiety caused by these thoughts. The thoughts or actions may be about checking on things, touching things, or worry about germs. This is an obsessive-compulsive disorder.  Strong fear of an object, place, or condition. This is a phobia.  Fear that others think bad things about you or being put down by other people. This is social anxiety.  Nightmares, flashbacks, staying away from people, or having panic attacks when reminded of a shocking or hurtful time or place from the past. This is post-traumatic stress.  Anxiety disorder may be treated in many ways. Some kinds of treatment have you talk about your beliefs, fears, and worries. You may learn how certain thoughts or feelings can raise anxiety. You may also learn what steps to take to lower anxiety. Other kinds of treatment may have you look back on a hurtful event, sad memory, or something you are afraid of. The doctor will help you deal with the feelings that you may have. You may learn ways to cope with unwanted events or thoughts by looking at your fears in a way that feels safe.  What care is needed at home?   Ask your doctor what you need to do when you go home. Make sure you ask questions if you do not understand what the doctor says.  Set a time to talk with a counselor  about your worries and feelings. This can help you with your anxiety.  Take care to follow all instructions when you take your medicines.  Limit alcohol and caffeine.  Learn ways to manage stress. Relaxation methods like reflection, deep breathing, and muscle relaxation may be helpful. Things like yoga, exercise, and jose chi are also good.  Talk about your feelings with family members and friends you trust. Talk to someone who can help you see how your thoughts at certain times may raise your anxiety.     What follow-up care is needed?   Your doctor may ask you to make visits to the office to check on your progress. Be sure to keep these visits.  What drugs may be needed?   The doctor may order drugs to help the physical signs of anxiety. Make sure that you take the drugs as taught to you by the doctor. Talk with your doctor about any side effects and ask how long you should take the drug.  Will physical activity be limited?   You may take part in physical activities. Some people are limited because of their anxiety or fear. Talk with your doctor about the right amount of activity for you.  What changes to diet are needed?   Eat a variety of healthy foods and limit drinks with caffeine. You should avoid alcohol, energy drinks, and over-the-counter stimulants.  What problems could happen?   If your anxiety is not treated, it can result in:  Staying away from work or social events  Not being able to do everyday tasks  Keeping away from family and friends  What can be done to prevent this health problem?   Learn what events, people, or things upset you. Limit your contact with them.  Talk about your feelings. Talk to someone who can help you see how your thoughts at certain times may raise your anxiety.  Seek support from your friends and family. Find someone who calms you down. Ask if you can call them when you are getting anxious.  When do I need to call the doctor?   You feel you may harm yourself or someone  else.  You can also call a mental health hotline for help.  You have any physical symptoms, such as chest pain, trouble breathing, or severe belly pain, that could be a sign of a serious problem.  If you are short of breath.  If you do not feel like you can be alone.  Teach Back: Helping You Understand   The Teach Back Method helps you understand the information we are giving you. After you talk with the staff, tell them in your own words what you learned. This helps to make sure the staff has described each thing clearly. It also helps to explain things that may have been confusing. Before going home, make sure you can do these:  I can tell you about my condition and the drugs I need to take.  I can tell you what may help lower my anxiety.  I can tell you what I will do if it is hard to breathe or I have chest pain.  I can tell you what I will do if I do not feel safe or cannot be alone.  Where can I learn more?   BILL  https://www.bill.org/Learn-More/Mental-Health-Conditions/Anxiety-Disorders   National Health Service  https://www.nhs.uk/conditions/generalised-anxiety-disorder/symptoms/   [employer] of Health ? Senior Health  https://www.meek.nih.gov/health/relieving-stress-anxiety-pfildnnmp-lvcxftftjv-hphleqwrsw   National [employer] of Mental Health  http://www.nimh.nih.gov/health/publications/anxiety-disorders/complete-index.shtml   Last Reviewed Date   2021-06-08  Consumer Information Use and Disclaimer   This information is not specific medical advice and does not replace information you receive from your health care provider. This is only a brief summary of general information. It does NOT include all information about conditions, illnesses, injuries, tests, procedures, treatments, therapies, discharge instructions or life-style choices that may apply to you. You must talk with your health care provider for complete information about your health and treatment options. This information should not be  used to decide whether or not to accept your health care providers advice, instructions or recommendations. Only your health care provider has the knowledge and training to provide advice that is right for you.  Copyright   Copyright © 2021 [employer]. and its affiliates and/or licensors. All rights reserved.      Patient Education       Insomnia Discharge Instructions   About this topic   Insomnia is a common sleep problem. You have trouble falling asleep or staying asleep when you have this problem. You may wake up early and not be able to fall back to sleep. Sometimes, you may wake up in the morning and not feel rested. Then, you may feel tired or sleepy all day. You may be grouchy, forget things, or have trouble focusing. It may impact your work, school, and relationships. You may not feel physically well.  Insomnia can last for a few days, weeks, or even months. For some people, insomnia may happen every day. For others it will happen a few times a week. Insomnia may be caused by stress or another health problem. It may also occur with a mental disorder. Doctors will work to find and treat the cause of your insomnia. Most often, this includes lifestyle changes.     What care is needed at home?   Ask your doctor what you need to do when you go home. Make sure you ask questions if you do not understand what the doctor says. This way you will know what you need to do.  Your doctor may ask you to keep a log of your daily sleep habits. Write down:  How long you sleep each night.  When you go to bed and when you wake up.  How long it takes to fall asleep  How often you have trouble falling or staying asleep  If you snore  If you have trouble staying awake during the day  Your daily activities and routine  What you ate or drank close to bedtime  What drugs you took in the evening and at what time  Whether or not you took a nap during the day  What follow-up care is needed?   Your doctor may ask you to make visits  to the office to check on your progress. Be sure to keep these visits. Talk with your doctor about any other drugs you are taking. Some of these drugs may cause insomnia.  Your doctor may send you to a sleep center for special sleep tests.  Your doctor may suggest you talk with a counselor. If you are stressed, talking with someone may help to lessen stress and help with your insomnia.  What lifestyle changes are needed?   Avoid drinking or eating things with caffeine late in the day and before bedtime.  Do not eat full meals close to bedtime.  Do not go to bed hungry. Try a light snack before bedtime.  Avoid drinking beer, wine, and mixed drinks (alcohol) before bedtime.  If you smoke, stop smoking.  Avoid taking long naps during the day. This can interfere with your sleep at night.  What drugs may be needed?   The doctor may order drugs to:  Help you sleep  Help with low mood or anxiety  Help with any physical problems  Will physical activity be limited?   Unless your doctor tells you to change your activity level, stay active. Avoid working out right before bedtime.  What problems could happen?   Poor performance at work or school  Weight problems  Anxiety or low mood  Accidents related to sleepiness or trouble focusing  Slow reaction time  What can be done to prevent this health problem?   Keep the same sleep schedule. Go to bed and get up at the same time each day.  Avoid taking naps during the day.  Sleep in a dark and quiet room at a comfortable temperature.  Before you go to bed, make a list of things you need to do the next day. This may keep you from worrying about them.  Prepare and get things ready for the morning before you go to bed.  Try reading a book or listening to calming music before falling asleep.  A warm bath may help to relax you.  Use a fan or white noise machine to help block out noise.  If you cannot fall asleep, get up and do some light activity until you feel sleepy.  Find ways to manage  stress. Use relaxation methods like deep breathing and muscle relaxation. Things like yoga or jose chi may also be helpful.  Avoid working on the computer, looking at your phone or tablet, or watching TV right before bed.  Avoid using your bedroom for things other than sleeping or sex.  When do I need to call the doctor?   Lack of sleep keeps you from your daily activities  You think your insomnia might be caused by some other health problem  You are not feeling better in 2 to 3 days or you are feeling worse  Teach Back: Helping You Understand   The Teach Back Method helps you understand the information we are giving you. After you talk with the staff, tell them in your own words what you learned. This helps to make sure the staff has described each thing clearly. It also helps to explain things that may have been confusing. Before going home, make sure you can do these:  I can tell you about my condition.  I can tell you what changes to my lifestyle may help me sleep better.  I can tell you what may help prevent insomnia.  Where can I learn more?   American Academy of Family Physicians  https://familydoctor.org/condition/insomnia/   NHS Choices  https://www.nhs.uk/conditions/insomnia/   Last Reviewed Date   2020-12-14  Consumer Information Use and Disclaimer   This information is not specific medical advice and does not replace information you receive from your health care provider. This is only a brief summary of general information. It does NOT include all information about conditions, illnesses, injuries, tests, procedures, treatments, therapies, discharge instructions or life-style choices that may apply to you. You must talk with your health care provider for complete information about your health and treatment options. This information should not be used to decide whether or not to accept your health care providers advice, instructions or recommendations. Only your health care provider has the knowledge and  training to provide advice that is right for you.  Copyright   Copyright © 2021 [employer] and its affiliates and/or licensors. All rights reserved.

## 2024-07-16 ENCOUNTER — E-VISIT (OUTPATIENT)
Dept: FAMILY MEDICINE | Facility: CLINIC | Age: 39
End: 2024-07-16
Payer: MEDICARE

## 2024-07-16 DIAGNOSIS — F32.A DEPRESSION, UNSPECIFIED DEPRESSION TYPE: ICD-10-CM

## 2024-07-16 DIAGNOSIS — F41.9 ANXIETY: Primary | ICD-10-CM

## 2024-07-16 PROCEDURE — 99499 UNLISTED E&M SERVICE: CPT | Mod: ,,, | Performed by: FAMILY MEDICINE

## 2024-07-16 RX ORDER — ESTRADIOL 0.1 MG/G
CREAM VAGINAL
Qty: 30 G | Refills: 2 | Status: SHIPPED | OUTPATIENT
Start: 2024-07-16

## 2024-07-17 NOTE — PROGRESS NOTES
" Patient ID: Shadia Johnson is a 39 y.o. female.    Chief Complaint: Mood Disorder (Entered automatically based on patient selection in [facility].)    The patient initiated a request through [facility] on 7/16/2024 for evaluation and management with a chief complaint of Mood Disorder (Entered automatically based on patient selection in [facility].)     I evaluated the questionnaire submission on 07-.    Ohs Peq Evisit Anxiety/Depression    7/16/2024  4:14 PM CDT - Filed by Patient   Do you agree to participate in an E-Visit? Yes   If you have any of the following symptoms, please present to your local emergency room or call 911:  I acknowledge   Medication requests for narcotics will not be addressed via an E-Visit.  Please schedule an appointment. I acknowledge   Are you pregnant, could you be pregnant, or are you breast feeding? Could be pregnant   What is the main issue you would like addressed today? Depression, severe anxiety, insomnia, myron tried to make a mental health appointment but cant figure out how on the karen. Been prescribed hydroxizine 50mg with no relief   Fear of embarrassment causes me to avoid doing things or speaking to people. No   I avoid activities in which I am the center of attention. Yes   Being embarrassed or looking stupid are among my worst fears. No   I would like to address: Medication for Anxiety or Depression   By selecting "I understand," you acknowledge that the answers that you provide for this questionnaire may not be immediately viewed by your provider or your care team. If you are personally dealing with suicidal thoughts or a crisis, please consider contacting your provider's office.  If your provider is not available, please consider taking action by: calling 911 or the National Suicide Prevention Lifeline any day, any time at 1-121.884.6470 or texting SIGNS to 879740 for 24/7 anonymous, free crisis counseling. I understand   Over the last 2 weeks, how often have you been " "bothered by the following problems?   Little interest or pleasure in doing things: More than half the days   Feeling down, depressed or hopeless: Several days   Patient's PHQ score (range: 0 - 6) [id number]   Feeling nervous, anxious, on edge Nearly everyday   Not being able to stop or control worrying Nearly everyday   Worrying too much about different things Nearly everyday   Trouble relaxing Nearly everyday   Being so restless that its hard to sit still More than half the days   Becoming easily annoyed or irritable Several days   Feeling afraid as if something awful might happen Nearly everyday   If you marked you are experiencing any of the aforementioned problems, how difficult have these made it for you to do your work, take care of things at home, or get along with other people? Somewhat difficult   TOTAL SCORE: (range: 0 - 21) 18   Do you want to address a new or existing medication? I would like to start a new medication that I do not already take   What is the name of the medication that you would like to start? Something that works.   Have you taken a similar medication in the past? No   Why are you requesting this particular medication? .    What medical condition is the  medication intended to treat? Anxiety, depression, insomnia   Provide any additional information you feel is important.    Please attach any relevant images or files    Are you able to take your vital signs? No         Encounter Diagnoses   Name Primary?    Anxiety Yes    Depression, unspecified depression type         Orders Placed This Encounter   Procedures    Ambulatory referral/consult to Psychiatry     Standing Status:   Future     Standing Expiration Date:   8/17/2025     Referral Priority:   Routine     Referral Type:   Psychiatric     Referral Reason:   Specialty Services Required     Requested Specialty:   Psychiatry     Number of Visits Requested:   1        Shadia "Jo" was seen today for mood disorder.    Diagnoses and all " orders for this visit:    Anxiety  -     Ambulatory referral/consult to Psychiatry; Future    Depression, unspecified depression type  -     Ambulatory referral/consult to Psychiatry; Future     Referral to Psychiatry placed    No follow-ups on file.      E-Visit Time Tracking:    Day 1 Time (in minutes): 3    Total Time (in minutes): 3

## 2024-08-14 ENCOUNTER — TELEPHONE (OUTPATIENT)
Dept: FAMILY MEDICINE | Facility: CLINIC | Age: 39
End: 2024-08-14
Payer: MEDICARE

## 2024-08-14 NOTE — TELEPHONE ENCOUNTER
Called Patient to confirm that Dr. Malcolm is her PCP and if so, set up a FU appt.  No answer.  Left voice message on an unidentified recorder requesting a call back.    PAIN

## 2024-08-30 DIAGNOSIS — Z79.890 HORMONE REPLACEMENT THERAPY (HRT): ICD-10-CM

## 2024-08-30 DIAGNOSIS — N95.1 VASOMOTOR SYMPTOMS DUE TO MENOPAUSE: ICD-10-CM

## 2024-08-30 RX ORDER — ESTRADIOL 0.1 MG/G
CREAM VAGINAL
Qty: 30 G | Refills: 0 | Status: SHIPPED | OUTPATIENT
Start: 2024-08-30

## 2024-08-30 RX ORDER — ESTRADIOL 1 MG/1
1 TABLET ORAL DAILY
Qty: 30 TABLET | Refills: 0 | Status: SHIPPED | OUTPATIENT
Start: 2024-08-30

## 2024-08-30 NOTE — TELEPHONE ENCOUNTER
Refill Routing Note   Medication(s) are not appropriate for processing by [facility] for the following reason(s):        Required labs outdated: mammogram  Required vitals outdated: BP    [facility] action(s):  Defer               Appointments  past 12m or future 3m with PCP    Date Provider   Last Visit   7/31/2023 Tabatha Antunez MD   Next Visit   Visit date not found Tabatha Antunez MD   ED visits in past 90 days: 0        Note composed:2:33 PM 08/30/2024

## 2024-09-10 RX ORDER — ESTRADIOL 2 MG/1
SYSTEM VAGINAL
Qty: 1 EACH | Refills: 0 | Status: SHIPPED | OUTPATIENT
Start: 2024-09-10

## 2024-09-10 NOTE — TELEPHONE ENCOUNTER
Refill Routing Note   Medication(s) are not appropriate for processing by [facility] for the following reason(s):      No active prescription written by provider    [facility] action(s):  Defer Care Due:  None identified            Appointments  past 12m or future 3m with PCP    Date Provider   Last Visit   7/31/2023 Tabatha Antunez MD   Next Visit   9/19/2024 Tabatha Antunez MD   ED visits in past 90 days: 0        Note composed:8:32 PM 09/09/2024

## 2024-09-19 ENCOUNTER — OFFICE VISIT (OUTPATIENT)
Dept: OBSTETRICS AND GYNECOLOGY | Facility: CLINIC | Age: 39
End: 2024-09-19
Payer: MEDICARE

## 2024-09-19 VITALS
DIASTOLIC BLOOD PRESSURE: 80 MMHG | BODY MASS INDEX: 29.83 KG/M2 | WEIGHT: 179.25 LBS | SYSTOLIC BLOOD PRESSURE: 124 MMHG

## 2024-09-19 DIAGNOSIS — Z01.419 WELL WOMAN EXAM WITH ROUTINE GYNECOLOGICAL EXAM: Primary | ICD-10-CM

## 2024-09-19 DIAGNOSIS — Z12.31 BREAST CANCER SCREENING BY MAMMOGRAM: ICD-10-CM

## 2024-09-19 DIAGNOSIS — N95.1 VASOMOTOR SYMPTOMS DUE TO MENOPAUSE: ICD-10-CM

## 2024-09-19 DIAGNOSIS — Z79.890 HORMONE REPLACEMENT THERAPY (HRT): ICD-10-CM

## 2024-09-19 PROCEDURE — 99999 PR PBB SHADOW E&M-EST. PATIENT-LVL II: CPT | Mod: PBBFAC,HCNC,, | Performed by: OBSTETRICS & GYNECOLOGY

## 2024-09-19 RX ORDER — ESTRADIOL 0.1 MG/G
CREAM VAGINAL
Qty: 42.5 G | Refills: 6 | Status: SHIPPED | OUTPATIENT
Start: 2024-09-19

## 2024-09-19 RX ORDER — ESTRADIOL 1 MG/1
1 TABLET ORAL DAILY
Qty: 30 TABLET | Refills: 11 | Status: SHIPPED | OUTPATIENT
Start: 2024-09-19

## 2024-09-19 NOTE — PROGRESS NOTES
SUBJECTIVE:   Shadia Johnson is a 39 y.o. female   for annual well woman exam. No LMP recorded (lmp unknown). Patient has had a hysterectomy..  She has no unusual complaints.      S/p supracervical hyst @ age 24 for bleeding after delivery, then cervical removal with BSO for endometriosis - stage 4  On HRT since then - currently on estradiol 1 mg  And using vaginal estring for vaginal atrophy.  However Estring is in back order, having to use vaginal estrogen     Past Medical History:   Diagnosis Date    Anemia     Anxiety     Asthma     Childhood    Depression     Endometriosis of pelvis     Endometriosis, severe     Stage 4    History of endometriosis     Hypertension     Interstitial cystitis     Ulcer     hunners     Past Surgical History:   Procedure Laterality Date    COLONOSCOPY      HYSTERECTOMY      endometriosis    interSTIM IMPLANT      LAPOROSCOPY      left salpingectomy      MAMMOGRAM  2010    TRACHELECTOMY  11     Social History     Socioeconomic History    Marital status: Other   Tobacco Use    Smoking status: Never    Smokeless tobacco: Never    Tobacco comments:     , .  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  Disabled, SSDI.  Kids: 9 & 2.   Substance and Sexual Activity    Alcohol use: No    Drug use: No    Sexual activity: Yes     Partners: Male     Birth control/protection: Surgical     Comment: : Todd  ( at hotel).    Social History Narrative    .  Occup:  Disabled.       Social Determinants of Health     Financial Resource Strain: Low Risk  (2024)    Overall Financial Resource Strain (CARDIA)     Difficulty of Paying Living Expenses: Not very hard   Food Insecurity: No Food Insecurity (2024)    Hunger Vital Sign     Worried About Running Out of Food in the Last Year: Never true     Ran Out of Food in the Last Year: Never true   Transportation Needs: No Transportation Needs (2023)    PRAPARE - Transportation     Lack of  Transportation (Medical): No     Lack of Transportation (Non-Medical): No   Physical Activity: Sufficiently Active (2024)    Exercise Vital Sign     Days of Exercise per Week: 3 days     Minutes of Exercise per Session: 60 min   Stress: Stress Concern Present (2024)    [employer] - Occupational Stress Questionnaire     Feeling of Stress : Very much   Housing Stability: Low Risk  (2023)    Housing Stability Vital Sign     Unable to Pay for Housing in the Last Year: No     Number of Places Lived in the Last Year: 1     Unstable Housing in the Last Year: No     Family History   Problem Relation Name Age of Onset    Alcohol abuse Mother BUDDY     Cancer Maternal Grandmother TIFFANIE         skin    Depression Maternal Grandmother TIFFANIE     Heart disease Paternal Uncle      Hyperlipidemia Paternal Uncle       OB History    Para Term  AB Living   2 2       2   SAB IAB Ectopic Multiple Live Births                  # Outcome Date GA Lbr Erasmo/2nd Weight Sex Type Anes PTL Lv   2 Para      Vag-Spont      1 Para      Vag-Spont         Obstetric Comments   S/p supracervical hyst @ age 24 for bleeding after delivery, then cervical removal with BSO for endometriosis - sstage 4   On HRT since then   H/o right lumpectomy - benign per pt   Denies abnl pap         Current Outpatient Medications   Medication Sig Dispense Refill    amLODIPine (NORVASC) 2.5 MG tablet Take 1 tablet (2.5 mg total) by mouth once daily. 30 tablet 0    betamethasone valerate 0.1% (VALISONE) 0.1 % Crea Apply topically 2 (two) times daily. 45 g 0    estradioL (ESTRACE) 0.01 % (0.1 mg/gram) vaginal cream Insert 1 g intravaginally twice weekly 30 g 0    estradioL (ESTRACE) 1 MG tablet Take 1 tablet (1 mg total) by mouth once daily. 30 tablet 0    ESTRING 2 mg (7.5 mcg /24 hour) vaginal ring INSERT 1 RING VAGINALLY EVERY 3 MONTHS 1 each 0    metoprolol succinate (TOPROL-XL) 50 MG 24 hr tablet Take 1 tablet (50  mg total) by mouth once daily. 90 tablet 1    triamcinolone acetonide 0.1% (KENALOG) 0.1 % ointment Apply topically 2 (two) times daily. To rash area for 10 days 30 g 0     No current facility-administered medications for this visit.     Allergies: Alcohol, unspecified       ROS:  GENERAL: Denies weight gain or weight loss. Feeling well overall.   SKIN: Denies rash or lesions.   HEAD: Denies head injury or headache.   NODES: Denies enlarged lymph nodes.   CHEST: Denies chest pain or shortness of breath.   CARDIOVASCULAR: Denies palpitations or left sided chest pain.   ABDOMEN: No abdominal pain, constipation, diarrhea, nausea, vomiting or rectal bleeding.   URINARY: No frequency, dysuria, hematuria, or burning on urination.  REPRODUCTIVE: Denies vaginal discharge, abnormal vaginal bleeding, lesions, pelvic pain  BREASTS: The patient performs breast self-examination and denies pain, lumps, or nipple discharge.   HEMATOLOGIC: No easy bruisability or excessive bleeding.  MUSCULOSKELETAL: Denies joint pain or swelling.   NEUROLOGIC: Denies syncope or weakness.   PSYCHIATRIC: Denies depression, anxiety or mood swings.      OBJECTIVE:   /80   Wt 81.3 kg (179 lb 3.7 oz)   LMP  (LMP Unknown)   BMI 29.83 kg/m²   The patient appears well, alert, oriented x 3, in no distress.  PSYCH:  Normal, full range of affect  NECK: negative, no thyromegaly, trachea midline  SKIN: normal, good color, good turgor and no acne, striae, hirsutism  BREAST EXAM: breasts appear normal, no suspicious masses, no skin or nipple changes or axillary nodes  ABDOMEN: soft, non-tender; bowel sounds normal; no masses,  no organomegaly and no hernias, masses, or hepatosplenomegaly  GENITALIA: normal external genitalia, no erythema, no discharge  BLADDER: soft  URETHRA: normal appearing urethra with no masses, tenderness or lesions and normal urethra, normal urethral meatus  VAGINA: Normal,   CERVIX: absent  UTERUS: uterus absent  ADNEXA: no mass,  fullness, tenderness      ASSESSMENT:   1. Health maintenance  -pap not indicated  -MMG ordered  -counseled on exercise and healthy diet, weight loss  -bone health:  Discussed Vitamin D and Calcium supplementation, weight bearing exercises  2.  Discussed safety at home/school/work, healthy and balanced diet, sleep hygiene, as well as violence/weapons exposure.   3.  HRT:  Refilled on vaginal estrace and estradiol tablets

## 2024-09-20 ENCOUNTER — PATIENT MESSAGE (OUTPATIENT)
Dept: OBSTETRICS AND GYNECOLOGY | Facility: CLINIC | Age: 39
End: 2024-09-20
Payer: MEDICARE

## 2024-09-23 RX ORDER — NITROFURANTOIN 25; 75 MG/1; MG/1
100 CAPSULE ORAL 2 TIMES DAILY
Qty: 14 CAPSULE | Refills: 0 | Status: SHIPPED | OUTPATIENT
Start: 2024-09-23

## 2024-09-27 ENCOUNTER — OFFICE VISIT (OUTPATIENT)
Dept: PSYCHIATRY | Facility: CLINIC | Age: 39
End: 2024-09-27
Payer: MEDICARE

## 2024-09-27 DIAGNOSIS — F41.1 GAD (GENERALIZED ANXIETY DISORDER): Primary | ICD-10-CM

## 2024-09-27 DIAGNOSIS — F33.1 MODERATE EPISODE OF RECURRENT MAJOR DEPRESSIVE DISORDER: ICD-10-CM

## 2024-09-27 RX ORDER — AMITRIPTYLINE HYDROCHLORIDE 25 MG/1
25 TABLET, FILM COATED ORAL NIGHTLY
Qty: 90 TABLET | Refills: 0 | Status: SHIPPED | OUTPATIENT
Start: 2024-09-27 | End: 2024-12-26

## 2024-09-27 NOTE — PROGRESS NOTES
"Outpatient Psychiatry Initial Visit ([facility])    9/27/2024    Shadia Johnson, a 39 y.o. female, for initial evaluation visit.  Patient is referred by Gerardo Malcolm Jr., MD.      The patient location is: Home in [place]  The chief complaint leading to consultation is: Anxiety, insomnia, depression    Visit type: audiovisual    Face to Face time with patient: 45 min  60 minutes of total time spent on the encounter, which includes face to face time and non-face to face time preparing to see the patient (eg, review of tests), Obtaining and/or reviewing separately obtained history, Documenting clinical information in the electronic or other health record, Independently interpreting results (not separately reported) and communicating results to the patient/family/caregiver, or Care coordination (not separately reported).         Each patient to whom he or she provides medical services by telemedicine is:  (1) informed of the relationship between the physician and patient and the respective role of any other health care provider with respect to management of the patient; and (2) notified that he or she may decline to receive medical services by telemedicine and may withdraw from such care at any time.    Notes:     Reason for Encounter: Referral for treatment    Complaints:  She has been experiencing generalized anxiety.  Used to take meds for anxiety- stopped when she needed to donate plasma to her son who had cancer.  Went "cold turkey" from oxycodone 30 and Roxicodone 15 mg daily x 12 years so that she could donate to her son.  Feels like she over thinks and has to find answers to everyone's questions.  Feels too emotional.  Feels this is the worst anxiety has been.    Current Medications:  Scheduled Meds: None psych related  Continuous Infusions: None  PRN Meds: Estrogen, s/p hysterectomy    Stressors:  Just got out of a long relationship with a person with schizoaffective disorder (was a former pt of mine) who did " "not take his meds.  Serious interstitial cystitis causing disability.  Has tried instillations, many medications, has a nerve stimulator, does pelvic floor PT with TENS unit.    History:     Past Psychiatric History:   Previous therapy:  Yes, in the past.  Previous psychiatric treatment and medication trials: yes - Took valium for anxiety and escitalopram (SI), sertraline (decreased libido), bupropion (uncomfortable feeling in head), amitriptyline for IC (low dose), Ambien (effective), trazodone (headaches)    Previous psychiatric hospitalizations: yes - for reported suicide attempt but was manipulation by .  Previous diagnoses: yes - MDD secondary to chronic illness, MADAY, insomnia, chronic fatigue syndrome (better with weight loss)  Previous suicide attempts: no  History of violence: no  Currently in treatment with PCP only.  Education: college graduate  Other pertinent history: None  Depression screening was performed with standardized tool: Yes - Depression    Substance Abuse History:  Recreational drugs: marijuana and smoked weed when younger, none currently  Use of alcohol: denied and allergic   Use of caffeine: caffeinated soft drinks 2-3 Dr. Peppers /day  Tobacco use: no    The following portions of the patient's history were reviewed and updated as appropriate: allergies, current medications, past family history, past medical history, past social history, past surgical history, and problem list.      Record Review: moderate      Review Of Systems:     Medical Review Of Systems:  Pertinent items are noted in HPI.    Psychiatric Review Of Systems:  Sleep: yes, "painsomnia"  Appetite changes: no  Weight changes: Losing weight intentionally- 110 lbs  Energy: yes  Interest/pleasure/anhedonia: no  Somatic symptoms: yes, interstitial cystitis pain  Libido: no  Anxiety/panic: yes  Guilty/hopeless: no  Self-injurious behavior/risky behavior: no  Any drugs: no  Alcohol: no     Current Evaluation:     Limited by " virtual format    Mental Status Evaluation:  Appearance:  unremarkable, age appropriate, normal weight, casually dressed   Behavior:  normal, cooperative   Speech:  no latency; no press   Mood:  steady, euthymic   Affect:  congruent and appropriate   Thought Process:  normal and logical   Thought Content:  normal, no suicidality, no homicidality, delusions, or paranoia   Sensorium:  grossly intact   Cognition:  grossly intact   Insight:  has awareness of illness   Judgment:  behavior is adequate to circumstances          9/27/2024   PHQ-9 Depression Patient Health Questionnaire    Over the last two weeks how often have you been bothered by little interest or pleasure in doing things 0    Over the last two weeks how often have you been bothered by feeling down, depressed or hopeless 2    Over the last two weeks how often have you been bothered by trouble falling or staying asleep, or sleeping too much 3    Over the last two weeks how often have you been bothered by feeling tired or having little energy 3    Over the last two weeks how often have you been bothered by a poor appetite or overeating 2    Over the last two weeks how often have you been bothered by feeling bad about yourself - or that you are a failure or have let yourself or your family down 2    Over the last two weeks how often have you been bothered by trouble concentrating on things, such as reading the newspaper or watching television 1    Over the last two weeks how often have you been bothered by moving or speaking so slowly that other people could have noticed. 0    Over the last two weeks how often have you been bothered by thoughts that you would be better off dead, or of hurting yourself 0    If you checked off any problems, how difficult have these problems made it for you to do your work, take care of things at home or get along with other people? Somewhat difficult    PHQ-9 Score 13    MADAY-7    Was test performed? Yes    1. Feeling nervous,  anxious, or on edge? Nearly everyday    2. Not being able to stop or control worrying? Nearly everyday    3. Worrying too much about different things? Nearly everyday    4. Trouble relaxing? More than half the days    5. Being so restless that it is hard to sit still? Not at all    6. Becoming easily annoyed or irritable? Several days    7. Feeling afraid as if something awful might happen? More than half the days    8. If you checked off any problems, how difficult have these problems made it for you to do your work, take care of things at home, or get along with other people? Somewhat difficult    MADAY-7 Score 14    Number answered (out of first 7) 7    Interpretation Moderate Anxiety        Physical/Somatic Complaints  The patient lists: pain    Functioning in Relationships:  Spouse/partner: Recently broken up   Peers: [place]  Employers: On disability from psych RN, on disability for interstitial cystitis    Other Pertinent Information   2 kids, 21 and 14 boys, both living at home.  Parents there staying currently temporarily.  Good family dynamics.    Denies efrain.  Had 6 days without sleep due to pain, started having minor VH, resolved with sleep.      FH positive for ADHD in son. AUD mom.    Trauma- non physical or sexual trauma.    Hx of panic attacks remotely, none for the past year    Assessment - Diagnosis - Goals:     MDD moderate recurrent, MADAY, insomnia due to pain from IC.        ICD-10-CM ICD-9-CM   1. MADAY (generalized anxiety disorder)  F41.1 300.02   2. Moderate episode of recurrent major depressive disorder  F33.1 296.32     Retrying amitriptyline, 25 mg nightly x 1 week, then 50 mg nightly.  Risks/bebefits/side effects discussed. Targeting sleep, anxiety, depression, IC.  F/u 3 weeks.      Ysabel Ying

## 2024-10-09 ENCOUNTER — HOSPITAL ENCOUNTER (OUTPATIENT)
Dept: RADIOLOGY | Facility: HOSPITAL | Age: 39
Discharge: HOME | End: 2024-10-09
Attending: OBSTETRICS & GYNECOLOGY
Payer: MEDICARE

## 2024-10-09 DIAGNOSIS — Z12.31 BREAST CANCER SCREENING BY MAMMOGRAM: ICD-10-CM

## 2024-10-09 PROCEDURE — 77067 SCR MAMMO BI INCL CAD: CPT | Mod: TC,HCNC

## 2024-10-24 ENCOUNTER — PATIENT MESSAGE (OUTPATIENT)
Dept: PSYCHIATRY | Facility: HOSPITAL | Age: 39
End: 2024-10-24
Payer: MEDICARE

## 2024-11-05 ENCOUNTER — OFFICE VISIT (OUTPATIENT)
Dept: PSYCHIATRY | Facility: CLINIC | Age: 39
End: 2024-11-05
Payer: MEDICARE

## 2024-11-05 DIAGNOSIS — F33.1 MODERATE EPISODE OF RECURRENT MAJOR DEPRESSIVE DISORDER: Primary | ICD-10-CM

## 2024-11-05 DIAGNOSIS — F41.1 GAD (GENERALIZED ANXIETY DISORDER): ICD-10-CM

## 2024-11-05 PROCEDURE — 99214 OFFICE O/P EST MOD 30 MIN: CPT | Mod: HCNC,95,, | Performed by: PHYSICIAN ASSISTANT

## 2024-11-05 PROCEDURE — G2211 COMPLEX E/M VISIT ADD ON: HCPCS | Mod: HCNC,95,, | Performed by: PHYSICIAN ASSISTANT

## 2024-11-05 PROCEDURE — 1160F RVW MEDS BY RX/DR IN RCRD: CPT | Mod: HCNC,CPTII,95, | Performed by: PHYSICIAN ASSISTANT

## 2024-11-05 PROCEDURE — 1159F MED LIST DOCD IN RCRD: CPT | Mod: HCNC,CPTII,95, | Performed by: PHYSICIAN ASSISTANT

## 2024-11-05 RX ORDER — AMITRIPTYLINE HYDROCHLORIDE 75 MG/1
75 TABLET ORAL NIGHTLY
Qty: 90 TABLET | Refills: 0 | Status: SHIPPED | OUTPATIENT
Start: 2024-11-05 | End: 2025-02-03

## 2024-11-05 NOTE — PROGRESS NOTES
Outpatient Psychiatry Follow-Up Visit ([facility])    11/5/2024    Clinical Status of Patient:  Outpatient (Ambulatory)    The patient location is: Home in [place]  The chief complaint leading to consultation is: F/u MDD, MADAY    Visit type: audiovisual    Face to Face time with patient: 5 min  12 minutes of total time spent on the encounter, which includes face to face time and non-face to face time preparing to see the patient (eg, review of tests), Obtaining and/or reviewing separately obtained history, Documenting clinical information in the electronic or other health record, Independently interpreting results (not separately reported) and communicating results to the patient/family/caregiver, or Care coordination (not separately reported).         Each patient to whom he or she provides medical services by telemedicine is:  (1) informed of the relationship between the physician and patient and the respective role of any other health care provider with respect to management of the patient; and (2) notified that he or she may decline to receive medical services by telemedicine and may withdraw from such care at any time.    Notes:     Chief Complaint:  Shadia Johnson is a 39 y.o. female who presents today for follow-up of depression and anxiety.  Met with patient.      Interval History and Content of Current Session:  Interim Events/Subjective Report/Content of Current Session: Pt presents for follow up of MDD and MADAY.  She is currently taking amitriptyline 50 mg nightly, started and increased since last visit.  She states that it has been helpful for her mood but hasn't improved her anxiety or sleep much, or her IC either.  Denies side effects.        Review of Systems   PSYCHIATRIC: Pertinant items are noted in the narrative.    Past Medical, Family and Social History: The patient's past medical, family and social history have been reviewed and updated as appropriate within the electronic medical record - see  encounter notes.    Compliance: yes    Side effects: None    Risk Parameters:  Patient reports no suicidal ideation  Patient reports no homicidal ideation  Patient reports no self-injurious behavior  Patient reports no violent behavior    Exam (detailed: at least 9 elements; comprehensive: all 15 elements)   Constitutional  Vitals:  Most recent vital signs, dated less than 90 days prior to this appointment, were reviewed.   There were no vitals filed for this visit.     General:  unremarkable, age appropriate     Musculoskeletal  Muscle Strength/Tone:  no tremor, no tic   Gait & Station:  Not observed     Psychiatric  Speech:  no latency; no press   Mood & Affect:  dysthymic  congruent and appropriate   Thought Process:  normal and logical   Associations:  intact   Thought Content:  normal, no suicidality, no homicidality, delusions, or paranoia   Insight:  has awareness of illness   Judgement: behavior is adequate to circumstances   Orientation:  grossly intact   Memory: intact for content of interview   Language: grossly intact   Attention Span & Concentration:  able to focus   Fund of Knowledge:  intact and appropriate to age and level of education     Assessment and Diagnosis   Status/Progress: Based on the examination today, the patient's problem(s) is/are improved and inadequately controlled.  New problems have not been presented today.   Lack of compliance are not complicating management of the primary condition.  There are no active rule-out diagnoses for this patient at this time.     General Impression: MDD and MADAY with IC, some improvement with amitriptyline 50 mg.  Will increase to 75 mg nightly.      ICD-10-CM ICD-9-CM   1. Moderate episode of recurrent major depressive disorder  F33.1 296.32   2. MADAY (generalized anxiety disorder)  F41.1 300.02     Increase amitriptyline to 75 mg nightly targeting anxiety, sleep, and IC.  Risks/bebefits/side effects discussed.  F/u  1m    Intervention/Counseling/Treatment Plan   Medication Management: The risks and benefits of medication were discussed with the patient.      Return to Clinic: 1 month    Visit today included increased complexity associated with the care of the episodic problem anxiety and sleep addressed and managing the longitudinal care of the patient due to the serious and/or complex managed problem(s) MDD, MADAY.

## 2024-11-13 ENCOUNTER — PATIENT MESSAGE (OUTPATIENT)
Dept: ADMINISTRATIVE | Facility: HOSPITAL | Age: 39
End: 2024-11-13
Payer: MEDICARE

## 2024-11-22 ENCOUNTER — PATIENT OUTREACH (OUTPATIENT)
Dept: ADMINISTRATIVE | Facility: HOSPITAL | Age: 39
End: 2024-11-22
Payer: MEDICARE

## 2024-12-05 ENCOUNTER — PATIENT MESSAGE (OUTPATIENT)
Dept: PSYCHIATRY | Facility: HOSPITAL | Age: 39
End: 2024-12-05
Payer: MEDICARE

## 2024-12-19 ENCOUNTER — OFFICE VISIT (OUTPATIENT)
Dept: PSYCHIATRY | Facility: CLINIC | Age: 39
End: 2024-12-19
Payer: MEDICARE

## 2024-12-19 DIAGNOSIS — F33.40 MDD (RECURRENT MAJOR DEPRESSIVE DISORDER) IN REMISSION: ICD-10-CM

## 2024-12-19 DIAGNOSIS — F41.1 GAD (GENERALIZED ANXIETY DISORDER): Primary | ICD-10-CM

## 2024-12-19 PROCEDURE — 99214 OFFICE O/P EST MOD 30 MIN: CPT | Mod: HCNC,95,, | Performed by: PHYSICIAN ASSISTANT

## 2024-12-19 PROCEDURE — G2211 COMPLEX E/M VISIT ADD ON: HCPCS | Mod: HCNC,95,, | Performed by: PHYSICIAN ASSISTANT

## 2024-12-19 PROCEDURE — 1160F RVW MEDS BY RX/DR IN RCRD: CPT | Mod: HCNC,CPTII,95, | Performed by: PHYSICIAN ASSISTANT

## 2024-12-19 PROCEDURE — 1159F MED LIST DOCD IN RCRD: CPT | Mod: HCNC,CPTII,95, | Performed by: PHYSICIAN ASSISTANT

## 2024-12-19 RX ORDER — AMITRIPTYLINE HYDROCHLORIDE 100 MG/1
100 TABLET ORAL NIGHTLY
Qty: 90 TABLET | Refills: 0 | Status: SHIPPED | OUTPATIENT
Start: 2024-12-19 | End: 2025-03-19

## 2024-12-19 NOTE — PROGRESS NOTES
Outpatient Psychiatry Follow-Up Visit ([facility])    12/19/2024    Clinical Status of Patient:  Outpatient (Ambulatory)    The patient location is: Home in [place]  The chief complaint leading to consultation is: F/u MDD, MADAY    Visit type: audiovisual    Face to Face time with patient: 5 min  12 minutes of total time spent on the encounter, which includes face to face time and non-face to face time preparing to see the patient (eg, review of tests), Obtaining and/or reviewing separately obtained history, Documenting clinical information in the electronic or other health record, Independently interpreting results (not separately reported) and communicating results to the patient/family/caregiver, or Care coordination (not separately reported).         Each patient to whom he or she provides medical services by telemedicine is:  (1) informed of the relationship between the physician and patient and the respective role of any other health care provider with respect to management of the patient; and (2) notified that he or she may decline to receive medical services by telemedicine and may withdraw from such care at any time.    Notes:     Chief Complaint:  Shadia Johnson is a 39 y.o. female who presents today for follow-up of depression and anxiety.  Met with patient.      Interval History and Content of Current Session:  Interim Events/Subjective Report/Content of Current Session: Pt presents for follow up of MDD and MADAY.  She is currently taking amitriptyline 75 mg nightly, increased since last visit.  She states that the increase has helped more with her anxiety, but she is still not sleeping more than several hours per night, and IC hasn't improved at all.    All the pt's questions were answered and all her concerns were addressed.        Review of Systems   PSYCHIATRIC: Pertinant items are noted in the narrative.    Past Medical, Family and Social History: The patient's past medical, family and social history  have been reviewed and updated as appropriate within the electronic medical record - see encounter notes.    Compliance: yes    Side effects: None    Risk Parameters:  Patient reports no suicidal ideation  Patient reports no homicidal ideation  Patient reports no self-injurious behavior  Patient reports no violent behavior    Exam (detailed: at least 9 elements; comprehensive: all 15 elements)   Constitutional  Vitals:  Most recent vital signs, dated less than 90 days prior to this appointment, were reviewed.   There were no vitals filed for this visit.     General:  unremarkable, age appropriate     Musculoskeletal  Muscle Strength/Tone:  no tremor, no tic   Gait & Station:  Not observed     Psychiatric  Speech:  no latency; no press   Mood & Affect:  dysthymic  congruent and appropriate   Thought Process:  normal and logical   Associations:  intact   Thought Content:  normal, no suicidality, no homicidality, delusions, or paranoia   Insight:  has awareness of illness   Judgement: behavior is adequate to circumstances   Orientation:  grossly intact   Memory: intact for content of interview   Language: grossly intact   Attention Span & Concentration:  able to focus   Fund of Knowledge:  intact and appropriate to age and level of education     Assessment and Diagnosis   Status/Progress: Based on the examination today, the patient's problem(s) is/are improved and inadequately controlled.  New problems have not been presented today.   Lack of compliance are not complicating management of the primary condition.  There are no active rule-out diagnoses for this patient at this time.     General Impression: MDD and MADAY with IC, some improvement in mood and anxiety with amitriptyline increase to 75 mg nightly but still struggling with sleep.      ICD-10-CM ICD-9-CM   1. MADAY (generalized anxiety disorder)  F41.1 300.02   2. MDD (recurrent major depressive disorder) in remission  F33.40 296.35       Increase amitriptyline to  100 mg nightly targeting anxiety, sleep, and IC.  Risks/bebefits/side effects discussed.  F/u 1m    Intervention/Counseling/Treatment Plan   Medication Management: The risks and benefits of medication were discussed with the patient.      Return to Clinic: 1 month    Visit today included increased complexity associated with the care of the episodic problem anxiety and sleep addressed and managing the longitudinal care of the patient due to the serious and/or complex managed problem(s) MDD, MADAY.

## 2025-01-30 DIAGNOSIS — Z00.00 ENCOUNTER FOR MEDICARE ANNUAL WELLNESS EXAM: ICD-10-CM

## 2025-03-31 ENCOUNTER — PATIENT MESSAGE (OUTPATIENT)
Dept: ADMINISTRATIVE | Facility: HOSPITAL | Age: 40
End: 2025-03-31
Payer: MEDICARE

## 2025-03-31 RX ORDER — AMITRIPTYLINE HYDROCHLORIDE 100 MG/1
100 TABLET ORAL NIGHTLY
Qty: 90 TABLET | Refills: 0 | OUTPATIENT
Start: 2025-03-31

## 2025-05-21 ENCOUNTER — ON-DEMAND VIRTUAL (OUTPATIENT)
Dept: URGENT CARE | Facility: CLINIC | Age: 40
End: 2025-05-21
Payer: MEDICARE

## 2025-05-21 DIAGNOSIS — L98.9 SKIN PROBLEM: Primary | ICD-10-CM

## 2025-05-21 RX ORDER — METHYLPREDNISOLONE 4 MG/1
TABLET ORAL
Qty: 21 EACH | Refills: 0 | Status: SHIPPED | OUTPATIENT
Start: 2025-05-21 | End: 2025-06-11

## 2025-05-22 NOTE — PROGRESS NOTES
Subjective:      Patient ID: Shadia Johnson is a 40 y.o. female.    Vitals:  vitals were not taken for this visit.     Chief Complaint: Skin Problem      Visit Type: TELE AUDIOVISUAL    Past Medical History:   Diagnosis Date    Anemia     Anxiety     Asthma     Childhood    Depression     Endometriosis of pelvis     Endometriosis, severe     Stage 4    History of endometriosis     Hypertension     Interstitial cystitis     Ulcer     hunners     Past Surgical History:   Procedure Laterality Date    BREAST MASS EXCISION Right     COLONOSCOPY  01/01/2011    HYSTERECTOMY  01/01/2010    endometriosis    interSTIM IMPLANT  01/01/2011    LAPOROSCOPY  01/01/2012    left salpingectomy      MAMMOGRAM  01/01/2010    TRACHELECTOMY  01/01/2012     Review of patient's allergies indicates:   Allergen Reactions    Alcohol, unspecified Anxiety, Hives, Itching and Swelling     Medications Ordered Prior to Encounter[1]  Family History   Problem Relation Name Age of Onset    Alcohol abuse Mother BUDDY     Cancer Maternal Grandmother TIFFANIE         skin    Depression Maternal Grandmother TIFFANIE     Heart disease Paternal Uncle      Hyperlipidemia Paternal Uncle         Medications Ordered                [id number] - [place] - [address] AT [facility] [address]  [place] [address]    Telephone: [phone]   Fax: [phone]   Hours: Not open 24 hours                         E-Prescribed (1 of 1)              methylPREDNISolone (MEDROL DOSEPACK) 4 mg tablet    Sig: use as directed       Start: 5/21/25     Quantity: 21 each Refills: 0                           Ohs Peq Odvv Intake    5/21/2025  8:08 PM CDT - Filed by Patient   What is your current physical address in the event of a medical emergency? [id number]   Are you able to take your vital signs? Yes   Systolic Blood Pressure: 136   Diastolic Blood Pressure: 80   Weight: 142   Height: 65   Pulse: 89    Temperature: 98.9   Respiration rate: 18   Pulse Oxygen: 98   Please attach any relevant images or files    Is your employer contracted with [facility]? No         Pt with 3 year hx flaky fingernails on 2 of her nails (has taken lamisil without relief) and 1 week hx rash to forarm.  Has been using betamethasone to rash to arm without improvement.    Two patient identifiers were used-name was repeated verbally as well as date of birth.  The patient was located in their home in the state [facility].          Skin:  Positive for rash.        Objective:   The physical exam was conducted virtually.  Physical Exam   Constitutional: She is oriented to person, place, and time. No distress.   HENT:   Head: Normocephalic and atraumatic.   Neck: Neck supple.   Pulmonary/Chest: Effort normal. No respiratory distress.   Abdominal: Normal appearance.   Musculoskeletal: Normal range of motion.         General: Normal range of motion.   Neurological: no focal deficit. She is alert and oriented to person, place, and time.   Skin: Skin is not pale.         Comments: See photos   Psychiatric: Her behavior is normal. Mood, judgment and thought content normal.                 Assessment:     1. Skin problem        Plan:       Skin problem  -     Ambulatory referral/consult to Dermatology    Other orders  -     methylPREDNISolone (MEDROL DOSEPACK) 4 mg tablet; use as directed  Dispense: 21 each; Refill: 0    Meds as above.  F/u in clinic if symptoms fail to resolve with treatment.    You must understand that you've received a virtual Care treatment only and that you may be released before all your medical problems are known or treated. You, the patient, will arrange for follow up care as instructed.  If your condition worsens we recommend that you receive another evaluation at an urgent care in person, the emergency room or contact your primary medical clinics after hours call service to discuss your concerns.               Present with the patient at the time of consultation: TELEMED PRESENT WITH PATIENT: None           [1]   Current Outpatient Medications on File Prior to Visit   Medication Sig Dispense Refill    amitriptyline (ELAVIL) 100 MG tablet Take 1 tablet (100 mg total) by mouth every evening. 90 tablet 0    betamethasone valerate 0.1% (VALISONE) 0.1 % Crea Apply topically 2 (two) times daily. 45 g 0    estradioL (ESTRACE) 0.01 % (0.1 mg/gram) vaginal cream Insert 1 g intravaginally twice weekly 42.5 g 6    estradioL (ESTRACE) 1 MG tablet Take 1 tablet (1 mg total) by mouth once daily. 30 tablet 11    ESTRING 2 mg (7.5 mcg /24 hour) vaginal ring INSERT 1 RING VAGINALLY EVERY 3 MONTHS 1 each 0    metoprolol succinate (TOPROL-XL) 50 MG 24 hr tablet Take 1 tablet (50 mg total) by mouth once daily. 90 tablet 1    nitrofurantoin, macrocrystal-monohydrate, (MACROBID) 100 MG capsule Take 1 capsule (100 mg total) by mouth 2 (two) times daily. 14 capsule 0     No current facility-administered medications on file prior to visit.

## 2025-06-09 ENCOUNTER — PATIENT MESSAGE (OUTPATIENT)
Dept: ADMINISTRATIVE | Facility: HOSPITAL | Age: 40
End: 2025-06-09
Payer: MEDICARE

## 2025-06-12 ENCOUNTER — PATIENT OUTREACH (OUTPATIENT)
Dept: ADMINISTRATIVE | Facility: HOSPITAL | Age: 40
End: 2025-06-12
Payer: MEDICARE

## 2025-06-12 NOTE — PROGRESS NOTES
Care Coordination Encounter Details:  Called patient regarding VB outreach. No answer. Left message along with contact information to call back. Letter mailed regarding same.     MyChart Portal Status:         [x]  Reviewed [employer] Portal Status offered / enrolled if applicable        Additional Notes:     MyChart Outcomes: Pt is enrolled & active          Updates Requested / Reviewed:        Updated Care Coordination Note, Care Everywhere, Care Team Updated, and Immunizations Reconciliation Completed or Queried: [place]         Health Maintenance Screening(s) Due:      Health Maintenance Topics Overdue:      VBHM Score: 1                             Health Maintenance Topic(s) Outreach Outcomes & Actions Taken:    Blood Pressure - Outreach Outcomes & Actions Taken  : UNABLE TO REACH THE PATIENT     Lab(s) - Outreach Outcomes & Actions Taken  : UNABLE TO REACH THE PATIENT    Primary Care Appt - Outreach Outcomes & Actions Taken  : UNABLE TO REACH THE PATIENT         Additional Notes:  I CALLED THE PATIENT AND WAS UNABLE TO REACH THE PATIENT OR L/M. PATIENT IS DUE FOR ANNUAL,BLOOD PRESSURE,AWV,LIPID PANEL,SDOH           Chronic Disease Management:     Diabetes Measures        Lab Results   Component Value Date    HGBA1C 5.7 (H) 06/16/2023           [x]  Reviewed chart for active Diabetes diagnosis     []  Scheduled necessary follow up appointments if needed         Additional Notes:             Hypertension Measures        BP Readings from Last 1 Encounters:   09/19/24 124/80           [x]  Reviewed chart for active Hypertension diagnosis     []  Reviewed & documented Home BP Cuff     []  Documented a Remote BP if needed & applicable     []  Scheduled necessary follow up appointments with Primary Care if needed         Additional Notes:  BLOOD PRESSURE REVIEWED   UNABLE TO UPDATE, UNABLE TO REACH THE PATIENT           Provider Team Continuity:     Last PCP Visit Date: 7/16/2024          [x]  Reviewed Primary  Care Provider Visits, Annual Wellness Visit, and Future          Appointments to ensure appointments have been scheduled and/or           completed        Additional Notes:  REVIEWED  PATIENT IS DUE FOR A ANNUAL AND AVW APPOINTMENT           Social Determinants of Health          [x]  Reviewed, completed, and/or updated the following sections:                  Food Insecurity, Transportation Needs, Financial Resource Strain,                 Tobacco Use        Additional Notes:  UNABLE TO TO REACH THE PATIENT, UNABLE TO L/M           Care Management, Digital Medicine, and/or Education Referrals    OPCM Risk Score: 38.7               Additional Notes:  THE PATIENT IS ELIGIBLE FOR HTN DIGITAL MEDICINE

## 2025-06-12 NOTE — LETTER
June 12, 2025    Shadia Rose Elizabeth  [address] [place] [address]             [place] - Care Coordination  Delma Kowalski LPN      Phone: [phone]   Fax: [phone]      Dear Shadia,      We at [facility] care about your health, and we noticed it has been over a year since you have had your last annual physical exam. To help you get the most out of each of your visits, we will review your information to make sure you are up to date on all of your recommended tests and/or procedures.    We have Dr. Malcolm listed as your primary care provider. If Dr. Malcolm is no longer your primary care provider, please contact us so that we may update our records accordingly.    At your earliest convenience, please call our clinic at [phone] to schedule an appointment, or you can schedule an appointment via the [facility] website. We look forward to seeing you.         Your Primary Care Team      If you have any questions or concerns, please don't hesitate to call.    Sincerely,        Delma Kowalski LPN

## 2025-06-12 NOTE — LETTER
June 12, 2025    Shadia Rose Flood  [address] [place] [address]             [place] - Care Coordination  Delma Kowalski LPN      Phone:    Fax:     Dear Shadia:    Your chart is indicating you may be due for the following and I will be happy to assist you in scheduling any needed appointments:ANNUAL,LIPID PANEL,BLOOD PRESSURE,AWV VISIT.      If you have had any of the above done at another facility, please bring the records or information with you to your next appointment or fax to [phone] so that your record at  [facility] will be complete.    We will be happy to assist you with scheduling any necessary appointments or you may contact the [facility] appointment desk at [phone] to schedule at your convenience.      Thanks for choosing [facility] for your healthcare needs.           If you have any questions or concerns, please don't hesitate to call.    Sincerely,        Delma Kowalski LPN

## 2025-07-30 ENCOUNTER — ON-DEMAND VIRTUAL (OUTPATIENT)
Dept: URGENT CARE | Facility: CLINIC | Age: 40
End: 2025-07-30
Payer: MEDICARE

## 2025-07-30 DIAGNOSIS — B96.89 BACTERIAL VAGINOSIS: Primary | ICD-10-CM

## 2025-07-30 DIAGNOSIS — N76.0 BACTERIAL VAGINOSIS: Primary | ICD-10-CM

## 2025-07-30 PROCEDURE — 98005 SYNCH AUDIO-VIDEO EST LOW 20: CPT | Mod: 95,,, | Performed by: NURSE PRACTITIONER

## 2025-07-30 RX ORDER — METRONIDAZOLE 500 MG/1
500 TABLET ORAL EVERY 12 HOURS
Qty: 14 TABLET | Refills: 0 | Status: SHIPPED | OUTPATIENT
Start: 2025-07-30 | End: 2025-08-06

## 2025-07-30 NOTE — PROGRESS NOTES
Subjective:      Patient ID: Shadia Johnson is a 40 y.o. female.    Vitals:  vitals were not taken for this visit.     Chief Complaint: Vaginitis      Visit Type: TELE AUDIOVISUAL    Past Medical History:   Diagnosis Date    Anemia     Anxiety     Asthma     Childhood    Depression     Endometriosis of pelvis     Endometriosis, severe     Stage 4    History of endometriosis     Hypertension     Interstitial cystitis     Ulcer     hunners     Past Surgical History:   Procedure Laterality Date    BREAST MASS EXCISION Right     COLONOSCOPY  01/01/2011    HYSTERECTOMY  01/01/2010    endometriosis    interSTIM IMPLANT  01/01/2011    LAPOROSCOPY  01/01/2012    left salpingectomy      MAMMOGRAM  01/01/2010    TRACHELECTOMY  01/01/2012     Review of patient's allergies indicates:   Allergen Reactions    Alcohol, unspecified Anxiety, Hives, Itching and Swelling     Medications Ordered Prior to Encounter[1]  Family History   Problem Relation Name Age of Onset    Alcohol abuse Mother BUDDY     Cancer Maternal Grandmother TIFFANIE         skin    Depression Maternal Grandmother TIFFANIE     Heart disease Paternal Uncle      Hyperlipidemia Paternal Uncle         Medications Ordered                [id number] - [place] - [address] AT [facility] [address]   [facility] [place] [address]    Telephone: [phone]   Fax: [phone]   Hours: Not open 24 hours                         E-Prescribed (1 of 1)              metroNIDAZOLE (FLAGYL) 500 MG tablet    Sig: Take 1 tablet (500 mg total) by mouth every 12 (twelve) hours. for 7 days       Start: 7/30/25     Quantity: 14 tablet Refills: 0                           Ohs Peq Odvv Intake    7/30/2025  5:24 PM CDT - Filed by Patient   What is your current physical address in the event of a medical emergency? [address] [facility] [address]   Are you able to take your vital signs? Yes   Systolic Blood Pressure: 119   Diastolic Blood  Pressure: 72   Weight: 136   Height: 65   Pulse: 84   Temperature: 98.7   Respiration rate: 18   Pulse Oxygen: 99   Please attach any relevant images or files    Is your employer contracted with [facility]? No         3 day hx symptoms of BV consistent with prior infections.    Two patient identifiers were used-name was repeated verbally as well as date of birth.  The patient was located in their home in the state [facility].          Genitourinary:  Positive for vaginal discharge.        Objective:   The physical exam was conducted virtually.  Physical Exam   Constitutional: She is oriented to person, place, and time. No distress.   HENT:   Head: Normocephalic and atraumatic.   Neck: Neck supple.   Pulmonary/Chest: Effort normal. No respiratory distress.   Abdominal: Normal appearance.   Musculoskeletal: Normal range of motion.         General: Normal range of motion.   Neurological: no focal deficit. She is alert and oriented to person, place, and time.   Skin: Skin is not pale.   Psychiatric: Her behavior is normal. Mood, judgment and thought content normal.       Assessment:     1. Bacterial vaginosis        Plan:       Bacterial vaginosis    -     metroNIDAZOLE (FLAGYL) 500 MG tablet; Take 1 tablet (500 mg total) by mouth every 12 (twelve) hours. for 7 days  Dispense: 14 tablet; Refill: 0    Meds as above.  F/u in clinic if symptoms fail to resolve with treatment.    You must understand that you've received a virtual Care treatment only and that you may be released before all your medical problems are known or treated. You, the patient, will arrange for follow up care as instructed.  If your condition worsens we recommend that you receive another evaluation at an urgent care in person, the emergency room or contact your primary medical clinics after hours call service to discuss your concerns.              Present with the patient at the time of consultation: TELEMED PRESENT WITH PATIENT:  None           [1]   Current Outpatient Medications on File Prior to Visit   Medication Sig Dispense Refill    estradioL (ESTRACE) 0.01 % (0.1 mg/gram) vaginal cream Insert 1 g intravaginally twice weekly 42.5 g 6    estradioL (ESTRACE) 1 MG tablet Take 1 tablet (1 mg total) by mouth once daily. 30 tablet 11     No current facility-administered medications on file prior to visit.

## 2025-08-13 ENCOUNTER — OFFICE VISIT (OUTPATIENT)
Dept: FAMILY MEDICINE | Facility: CLINIC | Age: 40
End: 2025-08-13
Payer: MEDICARE

## 2025-08-13 VITALS
OXYGEN SATURATION: 99 % | BODY MASS INDEX: 22.63 KG/M2 | WEIGHT: 135.81 LBS | DIASTOLIC BLOOD PRESSURE: 92 MMHG | RESPIRATION RATE: 18 BRPM | SYSTOLIC BLOOD PRESSURE: 122 MMHG | TEMPERATURE: 98 F | HEART RATE: 86 BPM | HEIGHT: 65 IN

## 2025-08-13 DIAGNOSIS — E11.620 TYPE 2 DIABETES MELLITUS WITH DIABETIC DERMATITIS, WITHOUT LONG-TERM CURRENT USE OF INSULIN: ICD-10-CM

## 2025-08-13 DIAGNOSIS — Z96.82 PRESENCE OF NEUROSTIMULATOR: ICD-10-CM

## 2025-08-13 DIAGNOSIS — I10 HYPERTENSION, BENIGN: Chronic | ICD-10-CM

## 2025-08-13 DIAGNOSIS — F33.1 MODERATE EPISODE OF RECURRENT MAJOR DEPRESSIVE DISORDER: ICD-10-CM

## 2025-08-13 DIAGNOSIS — Z00.00 ENCOUNTER FOR MEDICARE ANNUAL WELLNESS EXAM: Primary | ICD-10-CM

## 2025-08-13 PROBLEM — E66.01 MORBID OBESITY: Status: RESOLVED | Noted: 2020-02-03 | Resolved: 2025-08-13

## 2025-08-13 PROCEDURE — 99999 PR PBB SHADOW E&M-EST. PATIENT-LVL V: CPT | Mod: PBBFAC,HCNC,, | Performed by: NURSE PRACTITIONER

## 2025-08-15 ENCOUNTER — PATIENT MESSAGE (OUTPATIENT)
Dept: PSYCHIATRY | Facility: CLINIC | Age: 40
End: 2025-08-15
Payer: MEDICARE

## 2025-08-28 ENCOUNTER — ON-DEMAND VIRTUAL (OUTPATIENT)
Dept: URGENT CARE | Facility: CLINIC | Age: 40
End: 2025-08-28
Payer: MEDICARE

## 2025-08-28 DIAGNOSIS — N76.0 BACTERIAL VAGINOSIS: Primary | ICD-10-CM

## 2025-08-28 DIAGNOSIS — B96.89 BACTERIAL VAGINOSIS: Primary | ICD-10-CM

## 2025-08-28 PROCEDURE — 98005 SYNCH AUDIO-VIDEO EST LOW 20: CPT | Mod: 95,,, | Performed by: NURSE PRACTITIONER

## 2025-08-28 RX ORDER — METRONIDAZOLE 500 MG/1
500 TABLET ORAL EVERY 12 HOURS
Qty: 14 TABLET | Refills: 0 | Status: SHIPPED | OUTPATIENT
Start: 2025-08-28 | End: 2025-09-04